# Patient Record
Sex: MALE | Race: WHITE | NOT HISPANIC OR LATINO
[De-identification: names, ages, dates, MRNs, and addresses within clinical notes are randomized per-mention and may not be internally consistent; named-entity substitution may affect disease eponyms.]

---

## 2018-08-17 ENCOUNTER — APPOINTMENT (OUTPATIENT)
Dept: VASCULAR SURGERY | Facility: CLINIC | Age: 73
End: 2018-08-17

## 2018-09-12 ENCOUNTER — OUTPATIENT (OUTPATIENT)
Dept: OUTPATIENT SERVICES | Facility: HOSPITAL | Age: 73
LOS: 1 days | Discharge: HOME | End: 2018-09-12

## 2018-09-12 ENCOUNTER — RECORD ABSTRACTING (OUTPATIENT)
Age: 73
End: 2018-09-12

## 2018-09-12 ENCOUNTER — APPOINTMENT (OUTPATIENT)
Dept: VASCULAR SURGERY | Facility: CLINIC | Age: 73
End: 2018-09-12
Payer: COMMERCIAL

## 2018-09-12 VITALS
DIASTOLIC BLOOD PRESSURE: 70 MMHG | WEIGHT: 161 LBS | SYSTOLIC BLOOD PRESSURE: 160 MMHG | HEIGHT: 60 IN | BODY MASS INDEX: 31.61 KG/M2

## 2018-09-12 DIAGNOSIS — Z86.79 PERSONAL HISTORY OF OTHER DISEASES OF THE CIRCULATORY SYSTEM: ICD-10-CM

## 2018-09-12 DIAGNOSIS — L97.919 NON-PRESSURE CHRONIC ULCER OF UNSPECIFIED PART OF RIGHT LOWER LEG WITH UNSPECIFIED SEVERITY: ICD-10-CM

## 2018-09-12 DIAGNOSIS — I73.9 PERIPHERAL VASCULAR DISEASE, UNSPECIFIED: ICD-10-CM

## 2018-09-12 DIAGNOSIS — I65.23 OCCLUSION AND STENOSIS OF BILATERAL CAROTID ARTERIES: ICD-10-CM

## 2018-09-12 DIAGNOSIS — Z87.891 PERSONAL HISTORY OF NICOTINE DEPENDENCE: ICD-10-CM

## 2018-09-12 PROCEDURE — 93880 EXTRACRANIAL BILAT STUDY: CPT

## 2018-09-12 PROCEDURE — 99213 OFFICE O/P EST LOW 20 MIN: CPT

## 2018-09-18 LAB
BUN SERPL-MCNC: 27 MG/DL
CREAT SERPL-MCNC: 1.5 MG/DL

## 2018-09-24 ENCOUNTER — FORM ENCOUNTER (OUTPATIENT)
Age: 73
End: 2018-09-24

## 2018-09-25 ENCOUNTER — OUTPATIENT (OUTPATIENT)
Dept: OUTPATIENT SERVICES | Facility: HOSPITAL | Age: 73
LOS: 1 days | Discharge: HOME | End: 2018-09-25

## 2018-09-25 DIAGNOSIS — I65.23 OCCLUSION AND STENOSIS OF BILATERAL CAROTID ARTERIES: ICD-10-CM

## 2018-10-03 ENCOUNTER — APPOINTMENT (OUTPATIENT)
Dept: VASCULAR SURGERY | Facility: CLINIC | Age: 73
End: 2018-10-03
Payer: COMMERCIAL

## 2018-10-03 DIAGNOSIS — I70.235 ATHEROSCLEROSIS OF NATIVE ARTERIES OF RIGHT LEG WITH ULCERATION OF OTHER PART OF FOOT: ICD-10-CM

## 2018-10-03 DIAGNOSIS — I65.23 OCCLUSION AND STENOSIS OF BILATERAL CAROTID ARTERIES: ICD-10-CM

## 2018-10-03 PROCEDURE — 93926 LOWER EXTREMITY STUDY: CPT

## 2018-10-03 PROCEDURE — 99213 OFFICE O/P EST LOW 20 MIN: CPT

## 2020-07-07 ENCOUNTER — APPOINTMENT (OUTPATIENT)
Dept: HEMATOLOGY ONCOLOGY | Facility: CLINIC | Age: 75
End: 2020-07-07
Payer: COMMERCIAL

## 2020-07-07 ENCOUNTER — LABORATORY RESULT (OUTPATIENT)
Age: 75
End: 2020-07-07

## 2020-07-07 VITALS
DIASTOLIC BLOOD PRESSURE: 88 MMHG | SYSTOLIC BLOOD PRESSURE: 200 MMHG | HEART RATE: 72 BPM | BODY MASS INDEX: 34.36 KG/M2 | WEIGHT: 175 LBS | HEIGHT: 60 IN | TEMPERATURE: 97.5 F | RESPIRATION RATE: 14 BRPM

## 2020-07-07 PROCEDURE — 88189 FLOWCYTOMETRY/READ 16 & >: CPT

## 2020-07-07 PROCEDURE — 99205 OFFICE O/P NEW HI 60 MIN: CPT

## 2020-07-08 LAB
ALBUMIN SERPL ELPH-MCNC: 4.4 G/DL
ALP BLD-CCNC: 76 U/L
ALT SERPL-CCNC: 21 U/L
ANION GAP SERPL CALC-SCNC: 13 MMOL/L
AST SERPL-CCNC: 24 U/L
BILIRUB SERPL-MCNC: 0.4 MG/DL
BUN SERPL-MCNC: 25 MG/DL
CALCIUM SERPL-MCNC: 9.4 MG/DL
CHLORIDE SERPL-SCNC: 100 MMOL/L
CO2 SERPL-SCNC: 25 MMOL/L
CREAT SERPL-MCNC: 1.5 MG/DL
FERRITIN SERPL-MCNC: 72 NG/ML
FOLATE SERPL-MCNC: >20 NG/ML
GLUCOSE SERPL-MCNC: 120 MG/DL
HBV CORE IGG+IGM SER QL: NONREACTIVE
HBV SURFACE AB SER QL: REACTIVE
HBV SURFACE AG SER QL: NONREACTIVE
HCT VFR BLD CALC: 47.3 %
HCV AB SER QL: NONREACTIVE
HCV S/CO RATIO: 0.1 S/CO
HGB BLD-MCNC: 15.1 G/DL
HIV1+2 AB SPEC QL IA.RAPID: NONREACTIVE
IRON SATN MFR SERPL: 17 %
IRON SERPL-MCNC: 64 UG/DL
IRON SERPL-MCNC: 65 UG/DL
LDH SERPL-CCNC: 266 U/L
MCHC RBC-ENTMCNC: 30.3 PG
MCHC RBC-ENTMCNC: 31.9 G/DL
MCV RBC AUTO: 94.8 FL
PLATELET # BLD AUTO: 137 K/UL
PMV BLD: 12.1 FL
POTASSIUM SERPL-SCNC: 4.6 MMOL/L
PROT SERPL-MCNC: 6.9 G/DL
RBC # BLD: 4.99 M/UL
RBC # FLD: 15 %
RETICS # AUTO: 2.2 %
RETICS AGGREG/RBC NFR: 108.3 K/UL
SODIUM SERPL-SCNC: 138 MMOL/L
TIBC SERPL-MCNC: 383 UG/DL
UIBC SERPL-MCNC: 318 UG/DL
VIT B12 SERPL-MCNC: 364 PG/ML
WBC # FLD AUTO: 13.52 K/UL

## 2020-07-08 NOTE — CONSULT LETTER
[Dear  ___] : Dear  [unfilled], [Consult Letter:] : I had the pleasure of evaluating your patient, [unfilled]. [( Thank you for referring [unfilled] for consultation for _____ )] : Thank you for referring [unfilled] for consultation for [unfilled] [Please see my note below.] : Please see my note below. [Consult Closing:] : Thank you very much for allowing me to participate in the care of this patient.  If you have any questions, please do not hesitate to contact me. [Sincerely,] : Sincerely, [FreeTextEntry3] : Yulissa Bermeo MD

## 2020-07-08 NOTE — REASON FOR VISIT
[Initial Consultation] : an initial consultation for [FreeTextEntry2] : Thrombocytopenia, self referred

## 2020-07-08 NOTE — PHYSICAL EXAM
[Restricted in physically strenuous activity but ambulatory and able to carry out work of a light or sedentary nature] : Status 1- Restricted in physically strenuous activity but ambulatory and able to carry out work of a light or sedentary nature, e.g., light house work, office work [Normal] : affect appropriate [de-identified] : distended

## 2020-07-08 NOTE — REVIEW OF SYSTEMS
[Easy Bruising] : a tendency for easy bruising [Negative] : Allergic/Immunologic [Easy Bleeding] : no tendency for easy bleeding [Swollen Glands] : no swollen glands

## 2020-07-08 NOTE — ASSESSMENT
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 fib discontinued 2/2 easy bruising, recent onset \par \par Leukocytosis\par --Patient reports h/o "polycythemia" requiring phlebotomies years ago, but not recently \par --Labwork today \par --US of the spleen ordered on 7/8/2020\par --Patient declines bone marrow biopsy\par --Will attempt to obtain records from Dr. Srivastava \par \par Mild iron deficiency \par --Sat 18%, ferritin  101\par --Not on supplementation\par \par Follow up in 3 weeks \par

## 2020-07-08 NOTE — HISTORY OF PRESENT ILLNESS
[de-identified] : Irma is a dyana 74 yo gentleman with extensive hematologic history, i have no records for review from his prior hematologists, all history is taken from the patient. \par He reports that 15-20 years ago he was followed at [facility] and was diagnosed with "polycythemia" requiring phlebotomies, he has not required any phlebotomies for many years now. He also reports having been diagnosed with ITP, he reports his platelets were really low and he required high dose steroids, since he has been managed with Prednisone at 5mg daily for many years. During past several years his platelets have been hovering between 70K to low normal range.\par He reports no history of bleeding, but recently he has developed easy busing. \par He continues with daily baby ASA, he reports that previously he was taking Pradaxa for A. fib, this got discontinued with easy bruisability onset. Bruising has not per patient improved with discontinuation of Pradaxa. \par He reports that he has had bone marrow biopsy years ago, and is not keen on repeating the procedure now. \par He does not know if his sleen is enlarged or not. \par He has been following with Dr. Srivastava over the past few years and now wishes to switch care. \par He reports no history of VTE or CVAs. \par he reports h/o cardiac sten in the past, subsequently he required CABG, done at [facility] approximately 3 years ago, this was complicated by what sounds like wound infection, requiring additional hospitalization for 3 weeks. He also reports h/o L carotid stent. There was also consideration for R carotid intervention, this was never done. He has h/o PAD as well. He was last seen by [facility] in 2018.  [de-identified] : 7/8/2020: Today apart from easy bruising he denies any other complaints. Denies recent weight loss, early satiety, bleeding GI or . He is aware his iron has been low in the past, he is not on oral iron supplementation. He is compliant with Prednisone and ASA. He declines BMBx at this time.

## 2020-07-09 LAB
ANA SER IF-ACNC: NEGATIVE
EPO SERPL-MCNC: 12.9 MIU/ML

## 2020-07-15 ENCOUNTER — OUTPATIENT (OUTPATIENT)
Dept: OUTPATIENT SERVICES | Facility: HOSPITAL | Age: 75
LOS: 1 days | Discharge: HOME | End: 2020-07-15
Payer: COMMERCIAL

## 2020-07-15 DIAGNOSIS — D69.6 THROMBOCYTOPENIA, UNSPECIFIED: ICD-10-CM

## 2020-07-15 LAB
AMINO ACID MPL: NORMAL
ASSAY DETAILS MPL: NORMAL
BLOCK/SPECIMEN ID: NORMAL
EXON MPL: NORMAL
GENE MPL: NORMAL
GENE XXX MUT ANL BLD/T: NORMAL
INTERPRETATION: NORMAL
Lab: NORMAL
MPL EXON 10 MUTATION: NOT DETECTED
MPL SPECIMEN SOURCE: NORMAL
MUTATION TYPE: NORMAL
MUTFREQ: NORMAL
NUCCHA: NORMAL
REFERENCE MPL: NORMAL
T(9;22)(ABL1,BCR)/CONTROL BLD/T: NORMAL

## 2020-07-15 PROCEDURE — 76705 ECHO EXAM OF ABDOMEN: CPT | Mod: 26

## 2020-07-28 ENCOUNTER — LABORATORY RESULT (OUTPATIENT)
Age: 75
End: 2020-07-28

## 2020-07-28 ENCOUNTER — OUTPATIENT (OUTPATIENT)
Dept: OUTPATIENT SERVICES | Facility: HOSPITAL | Age: 75
LOS: 1 days | Discharge: HOME | End: 2020-07-28

## 2020-07-28 ENCOUNTER — APPOINTMENT (OUTPATIENT)
Dept: HEMATOLOGY ONCOLOGY | Facility: CLINIC | Age: 75
End: 2020-07-28
Payer: COMMERCIAL

## 2020-07-28 VITALS
HEIGHT: 60 IN | RESPIRATION RATE: 14 BRPM | SYSTOLIC BLOOD PRESSURE: 237 MMHG | WEIGHT: 173 LBS | DIASTOLIC BLOOD PRESSURE: 102 MMHG | BODY MASS INDEX: 33.96 KG/M2 | HEART RATE: 63 BPM | TEMPERATURE: 97 F

## 2020-07-28 DIAGNOSIS — D69.6 THROMBOCYTOPENIA, UNSPECIFIED: ICD-10-CM

## 2020-07-28 DIAGNOSIS — D72.829 ELEVATED WHITE BLOOD CELL COUNT, UNSPECIFIED: ICD-10-CM

## 2020-07-28 PROCEDURE — 99213 OFFICE O/P EST LOW 20 MIN: CPT

## 2020-07-30 LAB
HCT VFR BLD CALC: 43.9 %
HGB BLD-MCNC: 14.3 G/DL
MCHC RBC-ENTMCNC: 30.6 PG
MCHC RBC-ENTMCNC: 32.6 G/DL
MCV RBC AUTO: 94 FL
PLATELET # BLD AUTO: 123 K/UL
PMV BLD: 12.5 FL
RBC # BLD: 4.67 M/UL
RBC # FLD: 14.5 %
WBC # FLD AUTO: 14.34 K/UL

## 2020-08-09 NOTE — CONSULT LETTER
[Dear  ___] : Dear  [unfilled], [( Thank you for referring [unfilled] for consultation for _____ )] : Thank you for referring [unfilled] for consultation for [unfilled] [Please see my note below.] : Please see my note below. [Consult Letter:] : I had the pleasure of evaluating your patient, [unfilled]. [Sincerely,] : Sincerely, [Consult Closing:] : Thank you very much for allowing me to participate in the care of this patient.  If you have any questions, please do not hesitate to contact me. [FreeTextEntry3] : Yulissa Bermeo MD

## 2020-08-09 NOTE — HISTORY OF PRESENT ILLNESS
[de-identified] : Irma is a dyana 74 yo gentleman with extensive hematologic history, i have no records for review from his prior hematologists, all history is taken from the patient. \par He reports that 15-20 years ago he was followed at [facility] and was diagnosed with "polycythemia" requiring phlebotomies, he has not required any phlebotomies for many years now. He also reports having been diagnosed with ITP, he reports his platelets were really low and he required high dose steroids, since he has been managed with Prednisone at 5mg daily for many years. During past several years his platelets have been hovering between 70K to low normal range.\par He reports no history of bleeding, but recently he has developed easy busing. \par He continues with daily baby ASA, he reports that previously he was taking Pradaxa for A. fib, this got discontinued with easy bruisability onset. Bruising has not per patient improved with discontinuation of Pradaxa. \par He reports that he has had bone marrow biopsy years ago, and is not keen on repeating the procedure now. \par He does not know if his sleen is enlarged or not. \par He has been following with Dr. Srivastava over the past few years and now wishes to switch care. \par He reports no history of VTE or CVAs. \par he reports h/o cardiac sten in the past, subsequently he required CABG, done at [facility] approximately 3 years ago, this was complicated by what sounds like wound infection, requiring additional hospitalization for 3 weeks. He also reports h/o L carotid stent. There was also consideration for R carotid intervention, this was never done. He has h/o PAD as well. He was last seen by [facility] in 2018.  [de-identified] : 7/8/2020: Today apart from easy bruising he denies any other complaints. Denies recent weight loss, early satiety, bleeding GI or . He is aware his iron has been low in the past, he is not on oral iron supplementation. He is compliant with Prednisone and ASA. He declines BMBx at this time. \par \par 7/28/2020: No complaints today,except easy bruising. He continues to take low dose prednisone and ASA. He has not done the US of the spleen. We have reviewed bloodwork today from 7/8/2020, that reveals no evidence of Jak2, MPL, CALR or BCR/ABL. 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

## 2020-08-09 NOTE — PHYSICAL EXAM
[Restricted in physically strenuous activity but ambulatory and able to carry out work of a light or sedentary nature] : Status 1- Restricted in physically strenuous activity but ambulatory and able to carry out work of a light or sedentary nature, e.g., light house work, office work [Normal] : affect appropriate [de-identified] : distended

## 2020-08-09 NOTE — ASSESSMENT
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 fib discontinued 2/2 easy bruising, recent onset \par \par Leukocytosis\par --Patient reports h/o "polycythemia" requiring phlebotomies years ago, but not recently \par --Jak2, CALR, MPL, BCR/ABL negative\par --US of the spleen ordered on 7/8/2020, not done yet\par --Patient declines bone marrow biopsy\par --Will attempt to obtain records from Dr. Srivastava \par \par Mild iron deficiency \par --Sat 18%, ferritin  101\par --Not on supplementation\par \par Follow up in 2-3 months\par

## 2020-09-23 ENCOUNTER — APPOINTMENT (OUTPATIENT)
Dept: HEMATOLOGY ONCOLOGY | Facility: CLINIC | Age: 75
End: 2020-09-23

## 2022-04-05 ENCOUNTER — LABORATORY RESULT (OUTPATIENT)
Age: 77
End: 2022-04-05

## 2022-04-05 ENCOUNTER — APPOINTMENT (OUTPATIENT)
Dept: HEMATOLOGY ONCOLOGY | Facility: CLINIC | Age: 77
End: 2022-04-05
Payer: COMMERCIAL

## 2022-04-05 VITALS
WEIGHT: 171.96 LBS | RESPIRATION RATE: 14 BRPM | TEMPERATURE: 98.5 F | DIASTOLIC BLOOD PRESSURE: 60 MMHG | BODY MASS INDEX: 33.76 KG/M2 | SYSTOLIC BLOOD PRESSURE: 174 MMHG | HEIGHT: 60 IN | HEART RATE: 58 BPM

## 2022-04-05 DIAGNOSIS — Z82.49 FAMILY HISTORY OF ISCHEMIC HEART DISEASE AND OTHER DISEASES OF THE CIRCULATORY SYSTEM: ICD-10-CM

## 2022-04-05 LAB
HCT VFR BLD CALC: 44.8 %
HGB BLD-MCNC: 14.4 G/DL
MCHC RBC-ENTMCNC: 28.3 PG
MCHC RBC-ENTMCNC: 32.1 G/DL
MCV RBC AUTO: 88.2 FL
PLATELET # BLD AUTO: 229 K/UL
PMV BLD: 10.4 FL
RBC # BLD: 5.08 M/UL
RBC # FLD: 16.3 %
WBC # FLD AUTO: 10.94 K/UL

## 2022-04-05 PROCEDURE — 99215 OFFICE O/P EST HI 40 MIN: CPT

## 2022-04-05 NOTE — HISTORY OF PRESENT ILLNESS
[de-identified] : Irma is a dyana 76 yo gentleman with extensive hematologic history, but no records for review from his prior hematologists, all history is taken from the patient.  He has history of HTN, CAD, CABG s/p stent placement (on ASA and Xarelto) complicated by peritonitis who presents to clinic to establish care.  He reports that 15-20 years ago he was followed at [facility] and was diagnosed with "polycythemia" requiring phlebotomies, he has not required any phlebotomies for many years now. He also reports having been diagnosed with ITP, he reports his platelets were really low and he required high dose steroids, since he has been managed with Prednisone at 5mg daily for many years. During past several years his platelets have been hovering between 70K to low normal range.  He reports no history of bleeding, but recently he has developed easy bruising. He continues with daily baby ASA, he reports that previously he was taking Pradaxa for A fib, which was discontinued with easy bruisability onset.  Bruising has not improved with discontinuation of Pradaxa, but is mostly noted on dorsal aspect of bilateral upper extremities.  He reports that he has had bone marrow biopsy years ago, and is not keen on repeating the procedure now. He has been following with Dr. Srivastava over the past few years and now wishes to switch care. He reports no history of VTE or CVAs.  He also reports h/o L carotid stent. There was also consideration for R carotid intervention, this was never done. He has h/o PAD as well. He was last seen by [facility] in 2018.  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  Of note, patient also is being worked up for prostatic adenocarcinoma, Gaby 7.   [de-identified] : 7/8/2020: Today apart from easy bruising he denies any other complaints. Denies recent weight loss, early satiety, bleeding GI or . He is aware his iron has been low in the past, he is not on oral iron supplementation. He is compliant with Prednisone and ASA. He declines BMBx at this time. \par \par 7/28/2020: No complaints today,except easy bruising. He continues to take low dose prednisone and ASA. He has not done the US of the spleen. We have reviewed bloodwork today from 7/8/2020, that reveals no evidence of Jak2, MPL, CALR or BCR/ABL. 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 \par \par 4/5/22\par Patient is here for a follow-up visit for newly diagnosed GE junction cancer, newly diagnosed prostate cancer, and history of leukocytosis/thrombocytosis using [place] interpretation and accompanied by spouse via telephone.  He is transitioning care from another provider previously known to the clinic.  Reviewed most recent PET/CT imaging which shows uptake in prostate apex.  He had screening EGD + colonoscopy which noted abnormality.  Reviewed EUS done 3.29.2022 with Dr. 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  Also reviewed prostate biopsy which confirm prostate adenocarcinoma.  PSA was reportedly elevated but not available for review at initial visit.  Patient denies fever, chills, nausea, vomiting, dyspnea, dysphagia, unintentional weight loss or bleeding.  He still takes prednisone 5mg daily for hx of ITP.  \par PET/CT (3.31.2022 - [facility]) IMPRESSION:1.  Mildly increased activity in the left posterior prostate apex likely reflecting biopsy-proven malignancy.  2.  No evidence of abnormal focal hypermetabolic activity in the distal esophagus with slight concentric wall thickening. 3.  No FDG avid lymphadenopathy or distant metastasis.

## 2022-04-05 NOTE — PHYSICAL EXAM
[Restricted in physically strenuous activity but ambulatory and able to carry out work of a light or sedentary nature] : Status 1- Restricted in physically strenuous activity but ambulatory and able to carry out work of a light or sedentary nature, e.g., light house work, office work [Normal] : affect appropriate [de-identified] : wears glasses [de-identified] : distended , midline abdominal scar  [de-identified] : small bruising on dorsal aspect of bilateral hands / forearms [de-identified] : ambulating with assistance of single point cane

## 2022-04-05 NOTE — ASSESSMENT
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 Metz  \par - will send to [facility], Dr. Delon Huerta, for consultation regarding if any role for SURG intervention \par \par # Prostate Cancer , [place] Score 7 (3+4) , dx in 03/2022\par - PSA is elevated but level unknown , requested records including DVD imaging for our review\par - will scan pathology report into system \par - PET/CT imaging which shows uptake in prostate apex but no evidence of abnormal focal hypermetabolic activity in the distal esophagus with slight concentric wall thickening \par - he is following with [facility], Dr. Flores \par - will discuss options for management of prostate cancer further following intervention for newly diagnosed GE junction adenocarcinoma\par \par # H. Pylori , also noted on endoscopy from 03/2022\par - not currently taking abx\par - followup with GI, Dr. 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, on Prednisone 5mg daily, platelet count fluctuates\par - Declined bone marrow biopsy on 7/7/2020\par - PT/PTT WNL on 5/16/2020\par - CBC, BMP, LFTs, PSA, PT/INR, aPTT, iron studies, ferritin level today\par \par # Leukocytosis\par - Patient reports h/o "polycythemia" requiring phlebotomies years ago, but not recently \par - Jak2, CALR, MPL, BCR/ABL negative\par - US of the spleen ordered on 7/8/2020, not done\par \par RTC in 2 weeks , sooner if needed\par \par seen/examined w/ NP Janessa; note reviewed;case discussed\par 76 yo Ashkenazi Uats[place] man with ECOG 1-2 and significant cardiac history is diagnosed same time with prostate cancer, intermideate favorable risk, and adenocarcinoma of EG junction, [id number]\par - prostate cancer; will get PSA; however, will hold the decision on management at this time given that he has EGJ adenocarcinioma\par - EGJ adenocarcinoma, [id number]; spoke to GI and [facility] (referring physician Dr Claudette Hilton). Concerned about significant cardiac problems; Spoke to thoracic surgery Dr Qiu; he will see pt; if not a surgical candidate, will administer definitive chemoradiation as per CROSS TRIAL\par - H.Pylori; spoke to GI to start triple therapy

## 2022-04-05 NOTE — REVIEW OF SYSTEMS
[Easy Bruising] : a tendency for easy bruising [Negative] : Allergic/Immunologic [Recent Change In Weight] : ~T no recent weight change [Chest Pain] : no chest pain [Shortness Of Breath] : no shortness of breath [Skin Rash] : no skin rash [Easy Bleeding] : no tendency for easy bleeding [Swollen Glands] : no swollen glands

## 2022-04-05 NOTE — REASON FOR VISIT
[Follow-Up Visit] : a follow-up visit for [Spouse] : spouse [FreeTextEntry2] : Thrombocytopenia, self referred [TWNoteComboBox1] : [place]ij[place]

## 2022-04-06 LAB
ALBUMIN SERPL ELPH-MCNC: 4.2 G/DL
ALP BLD-CCNC: 100 U/L
ALT SERPL-CCNC: 19 U/L
ANION GAP SERPL CALC-SCNC: 13 MMOL/L
APTT BLD: 35.1 SEC
AST SERPL-CCNC: 20 U/L
BILIRUB DIRECT SERPL-MCNC: <0.2 MG/DL
BILIRUB INDIRECT SERPL-MCNC: >0 MG/DL
BILIRUB SERPL-MCNC: 0.2 MG/DL
BUN SERPL-MCNC: 30 MG/DL
CALCIUM SERPL-MCNC: 9.7 MG/DL
CHLORIDE SERPL-SCNC: 106 MMOL/L
CO2 SERPL-SCNC: 21 MMOL/L
CREAT SERPL-MCNC: 1.3 MG/DL
EGFR: 57 ML/MIN/1.73M2
FERRITIN SERPL-MCNC: 111 NG/ML
GLUCOSE SERPL-MCNC: 105 MG/DL
INR PPP: 1.05 RATIO
IRON SATN MFR SERPL: 10 %
IRON SERPL-MCNC: 35 UG/DL
POTASSIUM SERPL-SCNC: 5.5 MMOL/L
PROT SERPL-MCNC: 7 G/DL
PSA FREE FLD-MCNC: 24 %
PSA FREE SERPL-MCNC: 2.77 NG/ML
PSA SERPL-MCNC: 11.6 NG/ML
PT BLD: 12.1 SEC
SODIUM SERPL-SCNC: 140 MMOL/L
TIBC SERPL-MCNC: 340 UG/DL
UIBC SERPL-MCNC: 305 UG/DL

## 2022-04-08 ENCOUNTER — APPOINTMENT (OUTPATIENT)
Dept: CARDIOTHORACIC SURGERY | Facility: CLINIC | Age: 77
End: 2022-04-08
Payer: MEDICARE

## 2022-04-08 VITALS
HEART RATE: 60 BPM | DIASTOLIC BLOOD PRESSURE: 91 MMHG | OXYGEN SATURATION: 94 % | RESPIRATION RATE: 12 BRPM | SYSTOLIC BLOOD PRESSURE: 184 MMHG | HEIGHT: 60 IN | WEIGHT: 171 LBS | BODY MASS INDEX: 33.57 KG/M2 | TEMPERATURE: 98.2 F

## 2022-04-08 PROCEDURE — 99213 OFFICE O/P EST LOW 20 MIN: CPT

## 2022-04-08 PROCEDURE — 99072 ADDL SUPL MATRL&STAF TM PHE: CPT

## 2022-04-08 RX ORDER — CHROMIUM 200 MCG
TABLET ORAL
Refills: 0 | Status: DISCONTINUED | COMMUNITY
End: 2022-04-08

## 2022-04-08 RX ORDER — AMLODIPINE BESYLATE 10 MG/1
10 TABLET ORAL
Refills: 0 | Status: DISCONTINUED | COMMUNITY
End: 2022-04-08

## 2022-04-08 RX ORDER — PREDNISONE 5 MG/1
5 TABLET ORAL
Refills: 0 | Status: ACTIVE | COMMUNITY

## 2022-04-08 RX ORDER — RANOLAZINE 500 MG/1
500 TABLET, FILM COATED, EXTENDED RELEASE ORAL
Refills: 0 | Status: DISCONTINUED | COMMUNITY
End: 2022-04-08

## 2022-04-08 RX ORDER — NITROGLYCERIN 400 UG/1
0.4 SPRAY ORAL
Refills: 0 | Status: ACTIVE | COMMUNITY

## 2022-04-08 RX ORDER — AZILSARTAN KAMEDOXOMIL AND CHLORTHALIDONE 40; 25 MG/1; MG/1
40-25 TABLET ORAL
Refills: 0 | Status: ACTIVE | COMMUNITY

## 2022-04-08 RX ORDER — LEVOTHYROXINE SODIUM 0.12 MG/1
125 TABLET ORAL
Refills: 0 | Status: DISCONTINUED | COMMUNITY
End: 2022-04-08

## 2022-04-08 RX ORDER — ROSUVASTATIN CALCIUM 40 MG/1
40 TABLET, FILM COATED ORAL
Refills: 0 | Status: DISCONTINUED | COMMUNITY
End: 2022-04-08

## 2022-04-08 RX ORDER — CARVEDILOL 3.12 MG/1
TABLET, FILM COATED ORAL
Refills: 0 | Status: DISCONTINUED | COMMUNITY
End: 2022-04-08

## 2022-04-08 RX ORDER — FEBUXOSTAT 40 MG/1
40 TABLET ORAL
Refills: 0 | Status: DISCONTINUED | COMMUNITY
End: 2022-04-08

## 2022-04-08 RX ORDER — ASPIRIN 81 MG
81 TABLET, DELAYED RELEASE (ENTERIC COATED) ORAL
Refills: 0 | Status: DISCONTINUED | COMMUNITY
End: 2022-04-08

## 2022-04-08 RX ORDER — CHOLECALCIFEROL (VITAMIN D3) 1250 MCG
1.25 MG TABLET ORAL
Refills: 0 | Status: DISCONTINUED | COMMUNITY
End: 2022-04-08

## 2022-04-08 RX ORDER — AMLODIPINE BESYLATE 5 MG/1
5 TABLET ORAL
Refills: 0 | Status: ACTIVE | COMMUNITY

## 2022-04-08 RX ORDER — PREDNISONE 5 MG/1
5 TABLET ORAL
Refills: 0 | Status: DISCONTINUED | COMMUNITY
End: 2022-04-08

## 2022-04-08 RX ORDER — ALLOPURINOL 100 MG/1
100 TABLET ORAL
Refills: 0 | Status: ACTIVE | COMMUNITY

## 2022-04-08 RX ORDER — ROSUVASTATIN CALCIUM 10 MG/1
10 TABLET, FILM COATED ORAL
Refills: 0 | Status: ACTIVE | COMMUNITY

## 2022-04-08 RX ORDER — AZILSARTAN KAMEDOXOMIL AND CHLORTHALIDONE 40; 25 MG/1; MG/1
40-25 TABLET ORAL
Refills: 0 | Status: DISCONTINUED | COMMUNITY
End: 2022-04-08

## 2022-04-08 RX ORDER — LEVOTHYROXINE SODIUM 0.12 MG/1
125 TABLET ORAL
Refills: 0 | Status: ACTIVE | COMMUNITY

## 2022-04-11 NOTE — HISTORY OF PRESENT ILLNESS
[FreeTextEntry1] : Mr. Villalta is a 76 y/o male that arrives today for evaluation of JE junction carcinoma. PMH significant for HTN, MI, CAD s/p CABG X 2 by Dr. Kath Sánchez, complicated by infection requiring sternal to pelvis incision, DLD,  h/o carotid stenosis,  left ICA stenting by Dr. Lucia, peripheral vascular stenting in b/l LE, prostate cancer. He arrives today for evaluation of his esophageal cancer with Dr. Delon Huerta\par \par PMD: Misty Sotelo \par Oncologist:PIA Shankar \par \par  Russian Jennifer [phone]

## 2022-04-11 NOTE — DATA REVIEWED
[FreeTextEntry1] : TECHNIQUE:\par  \par 60 minutes following injection of the radiopharmaceutical, PET/CT imaging was performed from the skull base to mid thigh.  Fasting serum glucose was 87 mg/dL prior to injection.  Oral or IV contrast was not given per protocol.  All SUV values reported represent maximum SUV (SUV max) unless otherwise specified.  \par  \par This study was interpreted using a lean body mass corrected SUV technique.  Please note this may result in lower SUV values compared to a body-weight corrected technique. If there is a prior PET/CT for comparison, please see this current report for lean body mass corrected SUV values for the current study and the prior study.\par  \par COMPARISON:\par  \par None available\par  \par FINDINGS:\par  \par Head and neck:\par  \par There is no suspicious FDG uptake in the head and neck.\par  \par No significant head and neck lymphadenopathy on CT.  There is mucosal thickening of the maxillary sinuses.\par  \par Chest:\par  \par There is no suspicious FDG uptake in the chest.\par  \par There is slight concentric wall thickening of the distal end of the esophagus without hypermetabolic activity.  Please note: Soft tissue lesions smaller than 1 x 1 x 1 cm are below the resolution of PET scan.\par  \par On CT, there is no supraclavicular, hilar, mediastinal or axillary lymphadenopathy.  Multivessel coronary calcification with stents and atherosclerotic aortic calcification are noted.  Status post median sternotomy and CABG.  Mildly enlarged heart.  No pleural or pericardial effusion.  No significant lung nodule.\par  \par Abdomen and pelvis:\par  \par There is no suspicious FDG uptake in the abdomen/pelvis.\par  \par The prostate is enlarged measuring 5.2 x 4.9 cm with mildly increased activity in the left posterior prostate apex image 207, SUV 1.9, likely reflecting biopsy-proven malignancy.\par  \par The solid organs of the abdomen and pelvis are unremarkable on unenhanced CT. Status post cholecystectomy.  No significant lymphadenopathy.  No acute bowel-related abnormality.  Fatty replacement of the pancreas, heavy atherosclerotic vascular calcification, severe sigmoid diverticulosis and bilateral femoral arterial stents are visualized.\par  \par Musculoskeletal and extremities:\par  \par There are no suspicious FDG-avid osseous lesions.\par  \par No aggressive osseous lesions are seen on CT. Scoliosis associated with multilevel degenerative changes in the spine.\par  \par Please note KEY IMAGES for this PET/CT study are visible in i[employer] by scrolling to the far right of the collection of image preview thumbnails.\par  \par Diagnostic confidence level used in this report:\par  \par Consistent with/compatible with or no modifier - greater than 98%\par Most likely - greater than 90%\par Likely/probably - greater than 75%\par Possibly 50%\par Less likely - less than 25%\par Unlikely - less than 5%\par  \par Electronic Signature: I personally reviewed the images and agree with this report. Final Report: Dictated by  and Signed by Attending Edelmira Colvin MD 3/31/2022 2:46 PM\par  \par IMPRESSION:\par  \par 1.  Mildly increased activity in the left posterior prostate apex likely reflecting biopsy-proven malignancy.  \par  \par 2.  No evidence of abnormal focal hypermetabolic activity in the distal esophagus with slight concentric wall thickening.\par  \par 3.  No FDG avid lymphadenopathy or distant metastasis.\par  \par Pathology for Endoscopy \par Slewert Classification Type 2 EG Junction Adenocarcinoma s/p Biopsy and Staging \par

## 2022-04-13 ENCOUNTER — APPOINTMENT (OUTPATIENT)
Dept: HEMATOLOGY ONCOLOGY | Facility: CLINIC | Age: 77
End: 2022-04-13
Payer: COMMERCIAL

## 2022-04-13 VITALS
HEART RATE: 62 BPM | HEIGHT: 60 IN | BODY MASS INDEX: 32.2 KG/M2 | DIASTOLIC BLOOD PRESSURE: 61 MMHG | RESPIRATION RATE: 20 BRPM | WEIGHT: 164 LBS | TEMPERATURE: 98.2 F | SYSTOLIC BLOOD PRESSURE: 147 MMHG

## 2022-04-13 PROCEDURE — 99215 OFFICE O/P EST HI 40 MIN: CPT

## 2022-04-13 RX ORDER — ONDANSETRON 8 MG/1
8 TABLET ORAL EVERY 8 HOURS
Qty: 21 | Refills: 2 | Status: ACTIVE | COMMUNITY
Start: 2022-04-13 | End: 1900-01-01

## 2022-04-13 RX ORDER — PROCHLORPERAZINE MALEATE 10 MG/1
10 TABLET ORAL
Qty: 180 | Refills: 3 | Status: ACTIVE | COMMUNITY
Start: 2022-04-13 | End: 1900-01-01

## 2022-04-13 NOTE — ASSESSMENT
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 Metz  \par - s/p evalauation by  [facility], Dr. Delon Huerta, for consultation regarding if any role for SURG intervention : consensus is to proceed w/ concurrent chemoradiation as per CROSS trial\par - spoke to Dr YENNI HILTON, [facility]: pt is seeing her 4/13/22 and would be advised about the date of intiation of RT\par - chemo is low dose carboplatin/paclitaxel weekly\par - explained side effects with pt and his wife\par - f/u with the 2nd weekly chemo dose\par \par # Prostate Cancer , Gaby Score 7 (3+4) , dx in 03/2022\par - PSA is elevated but level unknown , requested records including DVD imaging for our review\par - will scan pathology report into system \par - PET/CT imaging which shows uptake in prostate apex but no evidence of abnormal focal hypermetabolic activity in the distal esophagus with slight concentric wall thickening \par - he is following with [facility], Dr. Flores \par - will discuss options for management of prostate cancer further following intervention for newly diagnosed GE junction adenocarcinoma\par \par # H. Pylori , also noted on endoscopy from 03/2022\par - not currently taking abx\par - followup with GI, Dr. 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, on Prednisone 5mg daily, platelet count fluctuates\par - Declined bone marrow biopsy on 7/7/2020\par - PT/PTT WNL on 5/16/2020\par - CBC, BMP, LFTs, PSA, PT/INR, aPTT, iron studies, ferritin level today\par \par # Leukocytosis\par - Patient reports h/o "polycythemia" requiring phlebotomies years ago, but not recently \par - Jak2, CALR, MPL, BCR/ABL negative\par - US of the spleen ordered on 7/8/2020, not done\par \par 78 yo Ashkenazi Jew man with ECOG 1-2 and significant cardiac history is diagnosed same time with prostate cancer, intermideate favorable risk, and adenocarcinoma of EG junction, [id number]\par - prostate cancer; will get PSA; however, will hold the decision on management at this time given that he has EGJ adenocarcinioma\par - EGJ adenocarcinoma, [id number]; spoke to GI and [facility] (referring physician Dr Yenni Hilton). Concerned about significant cardiac problems; Spoke to thoracic surgery Dr Qiu: not a surgical candidate, will administer definitive chemoradiation as per CROSS TRIAL\par - H.Pylori; spoke to GI to start triple therapy\par - ITP : plats count 220k; on prednisone 5mg daily; not sure why; would reevalaute this next visit

## 2022-04-13 NOTE — REASON FOR VISIT
[Follow-Up Visit] : a follow-up visit for [Spouse] : spouse [FreeTextEntry2] : Thrombocytopenia, self referred [TWNoteComboBox1] : [place]

## 2022-04-13 NOTE — PHYSICAL EXAM
[Restricted in physically strenuous activity but ambulatory and able to carry out work of a light or sedentary nature] : Status 1- Restricted in physically strenuous activity but ambulatory and able to carry out work of a light or sedentary nature, e.g., light house work, office work [Normal] : affect appropriate [de-identified] : wears glasses [de-identified] : distended , midline abdominal scar  [de-identified] : ambulating with assistance of single point cane [de-identified] : small bruising on dorsal aspect of bilateral hands / forearms

## 2022-04-13 NOTE — HISTORY OF PRESENT ILLNESS
[de-identified] : 7/8/2020: Today apart from easy bruising he denies any other complaints. Denies recent weight loss, early satiety, bleeding GI or . He is aware his iron has been low in the past, he is not on oral iron supplementation. He is compliant with Prednisone and ASA. He declines BMBx at this time. \par \par 7/28/2020: No complaints today,except easy bruising. He continues to take low dose prednisone and ASA. He has not done the US of the spleen. We have reviewed bloodwork today from 7/8/2020, that reveals no evidence of Jak2, MPL, CALR or BCR/ABL. 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 \par \par 4/5/22\par Patient is here for a follow-up visit for newly diagnosed GE junction cancer, newly diagnosed prostate cancer, and history of leukocytosis/thrombocytosis using Gabonese interpretation and accompanied by spouse via telephone.  He is transitioning care from another provider previously known to the clinic.  Reviewed most recent PET/CT imaging which shows uptake in prostate apex.  He had screening EGD + colonoscopy which noted abnormality.  Reviewed EUS done 3.29.2022 with Dr. 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  Also reviewed prostate biopsy which confirm prostate adenocarcinoma.  PSA was reportedly elevated but not available for review at initial visit.  Patient denies fever, chills, nausea, vomiting, dyspnea, dysphagia, unintentional weight loss or bleeding.  He still takes prednisone 5mg daily for hx of ITP.  \par PET/CT (3.31.2022 - [facility]) IMPRESSION:1.  Mildly increased activity in the left posterior prostate apex likely reflecting biopsy-proven malignancy.  2.  No evidence of abnormal focal hypermetabolic activity in the distal esophagus with slight concentric wall thickening. 3.  No FDG avid lymphadenopathy or distant metastasis.  [de-identified] : Irma is a dyana 76 yo gentleman with extensive hematologic history, but no records for review from his prior hematologists, all history is taken from the patient.  He has history of HTN, CAD, CABG s/p stent placement (on ASA and Xarelto) complicated by peritonitis who presents to clinic to establish care.  He reports that 15-20 years ago he was followed at [facility] and was diagnosed with "polycythemia" requiring phlebotomies, he has not required any phlebotomies for many years now. He also reports having been diagnosed with ITP, he reports his platelets were really low and he required high dose steroids, since he has been managed with Prednisone at 5mg daily for many years. During past several years his platelets have been hovering between 70K to low normal range.  He reports no history of bleeding, but recently he has developed easy bruising. He continues with daily baby ASA, he reports that previously he was taking Pradaxa for A fib, which was discontinued with easy bruisability onset.  Bruising has not improved with discontinuation of Pradaxa, but is mostly noted on dorsal aspect of bilateral upper extremities.  He reports that he has had bone marrow biopsy years ago, and is not keen on repeating the procedure now. He has been following with Dr. Srivastava over the past few years and now wishes to switch care. He reports no history of VTE or CVAs.  He also reports h/o L carotid stent. There was also consideration for R carotid intervention, this was never done. He has h/o PAD as well. He was last seen by [facility] in 2018.  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  Of note, patient also is being worked up for prostatic adenocarcinoma, Gaby 7.

## 2022-04-13 NOTE — CONSULT LETTER
[Dear  ___] : Dear  [unfilled], [Consult Letter:] : I had the pleasure of evaluating your patient, [unfilled]. [( Thank you for referring [unfilled] for consultation for _____ )] : Thank you for referring [unfilled] for consultation for [unfilled] [Please see my note below.] : Please see my note below. [Consult Closing:] : Thank you very much for allowing me to participate in the care of this patient.  If you have any questions, please do not hesitate to contact me. [Sincerely,] : Sincerely, [FreeTextEntry3] : Heriberto Shankar DO\par Attending Physician,\par Hematology/ Medical Oncology\par [phone] office\par \par

## 2022-04-25 ENCOUNTER — APPOINTMENT (OUTPATIENT)
Dept: INFUSION THERAPY | Facility: CLINIC | Age: 77
End: 2022-04-25

## 2022-04-25 RX ORDER — FAMOTIDINE 10 MG/ML
20 INJECTION INTRAVENOUS ONCE
Refills: 0 | Status: COMPLETED | OUTPATIENT
Start: 2022-04-25 | End: 2022-04-25

## 2022-04-25 RX ORDER — IRON SUCROSE 20 MG/ML
200 INJECTION, SOLUTION INTRAVENOUS ONCE
Refills: 0 | Status: COMPLETED | OUTPATIENT
Start: 2022-04-25 | End: 2022-04-25

## 2022-04-25 RX ORDER — PACLITAXEL 6 MG/ML
85 INJECTION, SOLUTION, CONCENTRATE INTRAVENOUS ONCE
Refills: 0 | Status: COMPLETED | OUTPATIENT
Start: 2022-04-25 | End: 2022-04-25

## 2022-04-25 RX ORDER — CARBOPLATIN 50 MG
150 VIAL (EA) INTRAVENOUS ONCE
Refills: 0 | Status: COMPLETED | OUTPATIENT
Start: 2022-04-25 | End: 2022-04-25

## 2022-04-25 RX ORDER — DIPHENHYDRAMINE HCL 50 MG
50 CAPSULE ORAL ONCE
Refills: 0 | Status: COMPLETED | OUTPATIENT
Start: 2022-04-25 | End: 2022-04-25

## 2022-04-25 RX ORDER — DEXAMETHASONE 0.5 MG/5ML
20 ELIXIR ORAL ONCE
Refills: 0 | Status: COMPLETED | OUTPATIENT
Start: 2022-04-25 | End: 2022-04-25

## 2022-04-25 RX ADMIN — FAMOTIDINE 104 MILLIGRAM(S): 10 INJECTION INTRAVENOUS at 12:28

## 2022-04-25 RX ADMIN — IRON SUCROSE 220 MILLIGRAM(S): 20 INJECTION, SOLUTION INTRAVENOUS at 12:29

## 2022-04-25 RX ADMIN — Medication 126 MILLIGRAM(S): at 12:28

## 2022-04-25 RX ADMIN — PACLITAXEL 264.17 MILLIGRAM(S): 6 INJECTION, SOLUTION, CONCENTRATE INTRAVENOUS at 13:47

## 2022-04-25 RX ADMIN — Medication 265 MILLIGRAM(S): at 13:47

## 2022-04-25 RX ADMIN — Medication 102 MILLIGRAM(S): at 12:28

## 2022-04-26 LAB
ALBUMIN SERPL ELPH-MCNC: 3.9 G/DL
ALP BLD-CCNC: 89 U/L
ALT SERPL-CCNC: 13 U/L
ANION GAP SERPL CALC-SCNC: 14 MMOL/L
AST SERPL-CCNC: 18 U/L
BILIRUB DIRECT SERPL-MCNC: <0.2 MG/DL
BILIRUB INDIRECT SERPL-MCNC: >0.2 MG/DL
BILIRUB SERPL-MCNC: 0.4 MG/DL
BUN SERPL-MCNC: 25 MG/DL
CALCIUM SERPL-MCNC: 8.7 MG/DL
CHLORIDE SERPL-SCNC: 103 MMOL/L
CO2 SERPL-SCNC: 21 MMOL/L
CREAT SERPL-MCNC: 1.5 MG/DL
EGFR: 48 ML/MIN/1.73M2
GLUCOSE SERPL-MCNC: 143 MG/DL
MAGNESIUM SERPL-MCNC: 2 MG/DL
POTASSIUM SERPL-SCNC: 4.1 MMOL/L
PROT SERPL-MCNC: 6.6 G/DL
SODIUM SERPL-SCNC: 138 MMOL/L

## 2022-04-29 ENCOUNTER — LABORATORY RESULT (OUTPATIENT)
Age: 77
End: 2022-04-29

## 2022-04-29 ENCOUNTER — APPOINTMENT (OUTPATIENT)
Dept: HEMATOLOGY ONCOLOGY | Facility: CLINIC | Age: 77
End: 2022-04-29
Payer: COMMERCIAL

## 2022-04-29 VITALS
TEMPERATURE: 98.8 F | WEIGHT: 166 LBS | BODY MASS INDEX: 32.42 KG/M2 | DIASTOLIC BLOOD PRESSURE: 83 MMHG | SYSTOLIC BLOOD PRESSURE: 183 MMHG | HEART RATE: 80 BPM

## 2022-04-29 DIAGNOSIS — D72.829 ELEVATED WHITE BLOOD CELL COUNT, UNSPECIFIED: ICD-10-CM

## 2022-04-29 DIAGNOSIS — D69.6 THROMBOCYTOPENIA, UNSPECIFIED: ICD-10-CM

## 2022-04-29 DIAGNOSIS — Z00.00 ENCOUNTER FOR GENERAL ADULT MEDICAL EXAMINATION W/OUT ABNORMAL FINDINGS: ICD-10-CM

## 2022-04-29 LAB
ALBUMIN SERPL ELPH-MCNC: 3.8 G/DL
ALP BLD-CCNC: 92 U/L
ALT SERPL-CCNC: 17 U/L
ANION GAP SERPL CALC-SCNC: 14 MMOL/L
AST SERPL-CCNC: 17 U/L
BILIRUB DIRECT SERPL-MCNC: 0.2 MG/DL
BILIRUB INDIRECT SERPL-MCNC: 0.3 MG/DL
BILIRUB SERPL-MCNC: 0.5 MG/DL
BUN SERPL-MCNC: 31 MG/DL
CALCIUM SERPL-MCNC: 9.1 MG/DL
CHLORIDE SERPL-SCNC: 102 MMOL/L
CO2 SERPL-SCNC: 24 MMOL/L
CREAT SERPL-MCNC: 1.5 MG/DL
EGFR: 48 ML/MIN/1.73M2
GLUCOSE SERPL-MCNC: 108 MG/DL
HCT VFR BLD CALC: 44.5 %
HGB BLD-MCNC: 14.2 G/DL
MAGNESIUM SERPL-MCNC: 1.9 MG/DL
MCHC RBC-ENTMCNC: 28.1 PG
MCHC RBC-ENTMCNC: 31.9 G/DL
MCV RBC AUTO: 88.1 FL
PLATELET # BLD AUTO: 208 K/UL
PMV BLD: 10.8 FL
POTASSIUM SERPL-SCNC: 5.1 MMOL/L
PROT SERPL-MCNC: 6.7 G/DL
RBC # BLD: 5.05 M/UL
RBC # FLD: 15.8 %
SODIUM SERPL-SCNC: 140 MMOL/L
WBC # FLD AUTO: 12.22 K/UL

## 2022-04-29 PROCEDURE — 99214 OFFICE O/P EST MOD 30 MIN: CPT

## 2022-04-29 NOTE — CONSULT LETTER
[Dear  ___] : Dear  [unfilled], [Consult Letter:] : I had the pleasure of evaluating your patient, [unfilled]. [( Thank you for referring [unfilled] for consultation for _____ )] : Thank you for referring [unfilled] for consultation for [unfilled] [Please see my note below.] : Please see my note below. [Consult Closing:] : Thank you very much for allowing me to participate in the care of this patient.  If you have any questions, please do not hesitate to contact me. [Sincerely,] : Sincerely, [FreeTextEntry3] : Heriberto Shankar DO\par Attending Physician,\par Hematology/ Medical Oncology\par [phone] office\par \par

## 2022-04-29 NOTE — HISTORY OF PRESENT ILLNESS
[Therapy: ___] : Therapy: [unfilled] [Cycle: ___] : Cycle: [unfilled] [FreeTextEntry1] : \par Started Carboplatin + Taxol on 4.25.2022 [de-identified] : 7/8/2020: Today apart from easy bruising he denies any other complaints. Denies recent weight loss, early satiety, bleeding GI or . He is aware his iron has been low in the past, he is not on oral iron supplementation. He is compliant with Prednisone and ASA. He declines BMBx at this time. \par \par 7/28/2020: No complaints today,except easy bruising. He continues to take low dose prednisone and ASA. He has not done the US of the spleen. We have reviewed bloodwork today from 7/8/2020, that reveals no evidence of Jak2, MPL, CALR or BCR/ABL. 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 \par \par 4/5/22\par Patient is here for a follow-up visit for newly diagnosed GE junction cancer, newly diagnosed prostate cancer, and history of leukocytosis/thrombocytosis using Burkinan interpretation and accompanied by spouse via telephone.  He is transitioning care from another provider previously known to the clinic.  Reviewed most recent PET/CT imaging which shows uptake in prostate apex.  He had screening EGD + colonoscopy which noted abnormality.  Reviewed EUS done 3.29.2022 with Dr. 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  Also reviewed prostate biopsy which confirm prostate adenocarcinoma.  PSA was reportedly elevated but not available for review at initial visit.  Patient denies fever, chills, nausea, vomiting, dyspnea, dysphagia, unintentional weight loss or bleeding.  He still takes prednisone 5mg daily for hx of ITP.  \par PET/CT (3.31.2022 - [facility]) IMPRESSION:1.  Mildly increased activity in the left posterior prostate apex likely reflecting biopsy-proven malignancy.  2.  No evidence of abnormal focal hypermetabolic activity in the distal esophagus with slight concentric wall thickening. 3.  No FDG avid lymphadenopathy or distant metastasis. \par \par 4/29/22\par Patient is here for a follow-up visit for newly diagnosed GE junction cancer, newly diagnosed prostate cancer, and history of leukocytosis/thrombocytosis using Burkinan interpretation and accompanied by spouse via telephone.  Patient started chemotherapy with Carboplatin + Taxol as part of chemoRT on 4.25.2022.  Patient denies fever, chills, nausea, vomiting, dyspnea, dysphagia, changes in urination or bleeding.  He reports some fatigue.  He has not yet seen GI for management of H. pylori.  He continues to take prednisone 5mg daily for hx of ITP.  He takes Xarelto for venous issue of lower extremities but is unsure if ever had thrombosis.   [de-identified] : Irma is a dyana 74 yo gentleman with extensive hematologic history, but no records for review from his prior hematologists, all history is taken from the patient.  He has history of HTN, CAD, CABG s/p stent placement (on ASA and Xarelto) complicated by peritonitis who presents to clinic to establish care.  He reports that 15-20 years ago he was followed at [facility] and was diagnosed with "polycythemia" requiring phlebotomies, he has not required any phlebotomies for many years now. He also reports having been diagnosed with ITP, he reports his platelets were really low and he required high dose steroids, since he has been managed with Prednisone at 5mg daily for many years. During past several years his platelets have been hovering between 70K to low normal range.  He reports no history of bleeding, but recently he has developed easy bruising. He continues with daily baby ASA, he reports that previously he was taking Pradaxa for A fib, which was discontinued with easy bruisability onset.  Bruising has not improved with discontinuation of Pradaxa, but is mostly noted on dorsal aspect of bilateral upper extremities.  He reports that he has had bone marrow biopsy years ago, and is not keen on repeating the procedure now. He has been following with Dr. Srivastava over the past few years and now wishes to switch care. He reports no history of VTE or CVAs.  He also reports h/o L carotid stent. There was also consideration for R carotid intervention, this was never done. He has h/o PAD as well. He was last seen by [facility] in 2018.  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  Of note, patient also is being worked up for prostatic adenocarcinoma, Gaby 7.

## 2022-04-29 NOTE — REVIEW OF SYSTEMS
[Easy Bruising] : a tendency for easy bruising [Negative] : Allergic/Immunologic [Fatigue] : fatigue [Recent Change In Weight] : ~T no recent weight change [Chest Pain] : no chest pain [Shortness Of Breath] : no shortness of breath [Skin Rash] : no skin rash [Easy Bleeding] : no tendency for easy bleeding [Swollen Glands] : no swollen glands

## 2022-04-29 NOTE — ASSESSMENT
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 Metz  \par - s/p evaluation by  [facility], Dr. Delon Huerta, for consultation regarding if any role for SURG intervention : consensus is to proceed w/ concurrent chemoradiation as per CROSS trial\par - spoke to Dr YENNI HILTON, [facility]: continuing concurrent chemoRT\par - c/w low dose carboplatin/paclitaxel weekly with RT (likely 5 week duration) , initiated on 4.25.2022\par \par # Prostate Cancer , [place] Score 7 (3+4) , dx in 03/2022\par - PSA is 11.6ng/mL from 04/2022 , requested records including DVD imaging for our review\par - will scan pathology report into system \par - PET/CT imaging which shows uptake in prostate apex but no evidence of abnormal focal hypermetabolic activity in the distal esophagus with slight concentric wall thickening \par - will discuss options for management of prostate cancer further following intervention for newly diagnosed GE junction adenocarcinoma\par \par # H. Pylori , also noted on endoscopy from 03/2022\par - not currently taking abx\par - followup with GI, Dr. Garibay, for management of infection ; reiterated importance\par \par # Thrombocytopenia , possibly reactive\par - Patient reports h/o of long standing ITP, on Prednisone 5mg daily, platelet count fluctuates\par - Declined bone marrow biopsy on 7/7/2020\par - PT/PTT WNL on 5/16/2020\par - ironsat 10%, ferritin 111 : continue with IV venofer x 5 doses total\par \par # Leukocytosis, stable\par - Patient reports h/o "polycythemia" requiring phlebotomies years ago, but not recently \par - Jak2, CALR, MPL, BCR/ABL negative\par - US of the spleen ordered on 7/8/2020, never done\par - will continue to monitor\par \par RTC with [facility] in 2 weeks with CBC, BMP, LFTs, Mg.  \par RTC in 4 weeks with Dr. Shankar with CBC, BMP, LFTs, Mg.\par \par 78 yo Ashkenazi [employer] man with ECOG 1-2 and significant cardiac history is diagnosed same time with prostate cancer, intermediate favorable risk, and adenocarcinoma of EG junction, [id number]\par - prostate cancer; PSA 11.6ng/mL ; however, will hold the decision on management at this time given that he has EGJ adenocarcinoma\par - EGJ adenocarcinoma, [id number]; spoke to GI and [facility] (referring physician Dr Yenni Hilton). Concerned about significant cardiac problems; Spoke to thoracic surgery Dr Qiu: not a surgical candidate, will administer definitive chemoradiation as per CROSS TRIAL\par - H.Pylori; reiterated importance for GI followup to start triple therapy (sent to Dr. Garibay) \par - ITP : plats count 208k; on prednisone 5mg daily; not sure why; would re-evalaute this following completion of therapy\par seen/examined w/ NP Janessa;note reviewed;case discussed\par

## 2022-04-29 NOTE — PHYSICAL EXAM
[Restricted in physically strenuous activity but ambulatory and able to carry out work of a light or sedentary nature] : Status 1- Restricted in physically strenuous activity but ambulatory and able to carry out work of a light or sedentary nature, e.g., light house work, office work [Normal] : affect appropriate [de-identified] : wears glasses [de-identified] : distended , midline abdominal scar  [de-identified] : ambulating with assistance of single point cane [de-identified] : small bruising on dorsal aspect of bilateral hands / forearms

## 2022-04-29 NOTE — REASON FOR VISIT
[Follow-Up Visit] : a follow-up visit for [Spouse] : spouse [FreeTextEntry2] : Thrombocytopenia, self referred [TWNoteComboBox1] : Norwegian

## 2022-05-02 ENCOUNTER — OUTPATIENT (OUTPATIENT)
Dept: OUTPATIENT SERVICES | Facility: HOSPITAL | Age: 77
LOS: 1 days | Discharge: HOME | End: 2022-05-02

## 2022-05-02 ENCOUNTER — LABORATORY RESULT (OUTPATIENT)
Age: 77
End: 2022-05-02

## 2022-05-02 ENCOUNTER — APPOINTMENT (OUTPATIENT)
Dept: INFUSION THERAPY | Facility: CLINIC | Age: 77
End: 2022-05-02

## 2022-05-02 DIAGNOSIS — D72.829 ELEVATED WHITE BLOOD CELL COUNT, UNSPECIFIED: ICD-10-CM

## 2022-05-02 DIAGNOSIS — C61 MALIGNANT NEOPLASM OF PROSTATE: ICD-10-CM

## 2022-05-02 DIAGNOSIS — C16.0 MALIGNANT NEOPLASM OF CARDIA: ICD-10-CM

## 2022-05-02 DIAGNOSIS — D69.6 THROMBOCYTOPENIA, UNSPECIFIED: ICD-10-CM

## 2022-05-02 RX ORDER — DEXAMETHASONE 0.5 MG/5ML
20 ELIXIR ORAL ONCE
Refills: 0 | Status: COMPLETED | OUTPATIENT
Start: 2022-05-02 | End: 2022-05-02

## 2022-05-02 RX ORDER — IRON SUCROSE 20 MG/ML
200 INJECTION, SOLUTION INTRAVENOUS ONCE
Refills: 0 | Status: COMPLETED | OUTPATIENT
Start: 2022-05-02 | End: 2022-05-02

## 2022-05-02 RX ORDER — CARBOPLATIN 50 MG
150 VIAL (EA) INTRAVENOUS ONCE
Refills: 0 | Status: COMPLETED | OUTPATIENT
Start: 2022-05-02 | End: 2022-05-02

## 2022-05-02 RX ORDER — PACLITAXEL 6 MG/ML
85 INJECTION, SOLUTION, CONCENTRATE INTRAVENOUS ONCE
Refills: 0 | Status: COMPLETED | OUTPATIENT
Start: 2022-05-02 | End: 2022-05-02

## 2022-05-02 RX ORDER — PACLITAXEL 6 MG/ML
85 INJECTION, SOLUTION, CONCENTRATE INTRAVENOUS ONCE
Refills: 0 | Status: DISCONTINUED | OUTPATIENT
Start: 2022-05-02 | End: 2022-05-02

## 2022-05-02 RX ORDER — FAMOTIDINE 10 MG/ML
20 INJECTION INTRAVENOUS ONCE
Refills: 0 | Status: COMPLETED | OUTPATIENT
Start: 2022-05-02 | End: 2022-05-02

## 2022-05-02 RX ORDER — DIPHENHYDRAMINE HCL 50 MG
50 CAPSULE ORAL ONCE
Refills: 0 | Status: COMPLETED | OUTPATIENT
Start: 2022-05-02 | End: 2022-05-02

## 2022-05-02 RX ADMIN — FAMOTIDINE 20 MILLIGRAM(S): 10 INJECTION INTRAVENOUS at 13:15

## 2022-05-02 RX ADMIN — PACLITAXEL 85 MILLIGRAM(S): 6 INJECTION, SOLUTION, CONCENTRATE INTRAVENOUS at 15:10

## 2022-05-02 RX ADMIN — Medication 265 MILLIGRAM(S): at 15:10

## 2022-05-02 RX ADMIN — Medication 126 MILLIGRAM(S): at 12:45

## 2022-05-02 RX ADMIN — PACLITAXEL 264.17 MILLIGRAM(S): 6 INJECTION, SOLUTION, CONCENTRATE INTRAVENOUS at 13:40

## 2022-05-02 RX ADMIN — Medication 50 MILLIGRAM(S): at 12:45

## 2022-05-02 RX ADMIN — Medication 150 MILLIGRAM(S): at 16:10

## 2022-05-02 RX ADMIN — Medication 102 MILLIGRAM(S): at 12:30

## 2022-05-02 RX ADMIN — IRON SUCROSE 200 MILLIGRAM(S): 20 INJECTION, SOLUTION INTRAVENOUS at 16:55

## 2022-05-02 RX ADMIN — Medication 20 MILLIGRAM(S): at 13:00

## 2022-05-02 RX ADMIN — IRON SUCROSE 220 MILLIGRAM(S): 20 INJECTION, SOLUTION INTRAVENOUS at 16:25

## 2022-05-02 RX ADMIN — FAMOTIDINE 104 MILLIGRAM(S): 10 INJECTION INTRAVENOUS at 13:00

## 2022-05-03 LAB
ALBUMIN SERPL ELPH-MCNC: 4.1 G/DL
ALP BLD-CCNC: 98 U/L
ALT SERPL-CCNC: 21 U/L
ANION GAP SERPL CALC-SCNC: 15 MMOL/L
AST SERPL-CCNC: 20 U/L
BILIRUB DIRECT SERPL-MCNC: <0.2 MG/DL
BILIRUB INDIRECT SERPL-MCNC: >0.2 MG/DL
BILIRUB SERPL-MCNC: 0.4 MG/DL
BUN SERPL-MCNC: 33 MG/DL
CALCIUM SERPL-MCNC: 9.3 MG/DL
CHLORIDE SERPL-SCNC: 102 MMOL/L
CO2 SERPL-SCNC: 20 MMOL/L
CREAT SERPL-MCNC: 1.5 MG/DL
EGFR: 48 ML/MIN/1.73M2
GLUCOSE SERPL-MCNC: 120 MG/DL
HCT VFR BLD CALC: 41.2 %
HGB BLD-MCNC: 13.5 G/DL
MAGNESIUM SERPL-MCNC: 1.9 MG/DL
MCHC RBC-ENTMCNC: 28.4 PG
MCHC RBC-ENTMCNC: 32.8 G/DL
MCV RBC AUTO: 86.7 FL
PLATELET # BLD AUTO: 240 K/UL
PMV BLD: 10.5 FL
POTASSIUM SERPL-SCNC: 4.8 MMOL/L
PROT SERPL-MCNC: 6.8 G/DL
RBC # BLD: 4.75 M/UL
RBC # FLD: 15.9 %
SODIUM SERPL-SCNC: 137 MMOL/L
WBC # FLD AUTO: 12.04 K/UL

## 2022-05-04 ENCOUNTER — NON-APPOINTMENT (OUTPATIENT)
Age: 77
End: 2022-05-04

## 2022-05-09 ENCOUNTER — LABORATORY RESULT (OUTPATIENT)
Age: 77
End: 2022-05-09

## 2022-05-09 ENCOUNTER — APPOINTMENT (OUTPATIENT)
Dept: INFUSION THERAPY | Facility: CLINIC | Age: 77
End: 2022-05-09

## 2022-05-09 RX ORDER — DEXAMETHASONE 0.5 MG/5ML
20 ELIXIR ORAL ONCE
Refills: 0 | Status: COMPLETED | OUTPATIENT
Start: 2022-05-09 | End: 2022-05-09

## 2022-05-09 RX ORDER — DIPHENHYDRAMINE HCL 50 MG
50 CAPSULE ORAL ONCE
Refills: 0 | Status: COMPLETED | OUTPATIENT
Start: 2022-05-09 | End: 2022-05-09

## 2022-05-09 RX ORDER — CARBOPLATIN 50 MG
150 VIAL (EA) INTRAVENOUS ONCE
Refills: 0 | Status: COMPLETED | OUTPATIENT
Start: 2022-05-09 | End: 2022-05-09

## 2022-05-09 RX ORDER — FAMOTIDINE 10 MG/ML
20 INJECTION INTRAVENOUS ONCE
Refills: 0 | Status: COMPLETED | OUTPATIENT
Start: 2022-05-09 | End: 2022-05-09

## 2022-05-09 RX ORDER — PACLITAXEL 6 MG/ML
85 INJECTION, SOLUTION, CONCENTRATE INTRAVENOUS ONCE
Refills: 0 | Status: COMPLETED | OUTPATIENT
Start: 2022-05-09 | End: 2022-05-09

## 2022-05-09 RX ORDER — IRON SUCROSE 20 MG/ML
200 INJECTION, SOLUTION INTRAVENOUS ONCE
Refills: 0 | Status: COMPLETED | OUTPATIENT
Start: 2022-05-09 | End: 2022-05-09

## 2022-05-09 RX ADMIN — Medication 102 MILLIGRAM(S): at 12:49

## 2022-05-09 RX ADMIN — Medication 126 MILLIGRAM(S): at 12:48

## 2022-05-09 RX ADMIN — IRON SUCROSE 220 MILLIGRAM(S): 20 INJECTION, SOLUTION INTRAVENOUS at 12:48

## 2022-05-09 RX ADMIN — FAMOTIDINE 104 MILLIGRAM(S): 10 INJECTION INTRAVENOUS at 12:49

## 2022-05-09 RX ADMIN — PACLITAXEL 264.17 MILLIGRAM(S): 6 INJECTION, SOLUTION, CONCENTRATE INTRAVENOUS at 13:18

## 2022-05-09 RX ADMIN — Medication 265 MILLIGRAM(S): at 14:32

## 2022-05-13 ENCOUNTER — APPOINTMENT (OUTPATIENT)
Dept: HEMATOLOGY ONCOLOGY | Facility: CLINIC | Age: 77
End: 2022-05-13
Payer: COMMERCIAL

## 2022-05-13 ENCOUNTER — LABORATORY RESULT (OUTPATIENT)
Age: 77
End: 2022-05-13

## 2022-05-13 VITALS
BODY MASS INDEX: 31.41 KG/M2 | HEIGHT: 60 IN | DIASTOLIC BLOOD PRESSURE: 73 MMHG | SYSTOLIC BLOOD PRESSURE: 170 MMHG | HEART RATE: 64 BPM | WEIGHT: 160 LBS | TEMPERATURE: 97.2 F

## 2022-05-13 PROCEDURE — 99213 OFFICE O/P EST LOW 20 MIN: CPT

## 2022-05-13 NOTE — REVIEW OF SYSTEMS
[Fatigue] : fatigue [Easy Bruising] : a tendency for easy bruising [Negative] : Cardiovascular [Diarrhea] : diarrhea [Recent Change In Weight] : ~T no recent weight change [Chest Pain] : no chest pain [Shortness Of Breath] : no shortness of breath [Skin Rash] : no skin rash [Easy Bleeding] : no tendency for easy bleeding [Swollen Glands] : no swollen glands [FreeTextEntry5] : r [FreeTextEntry7] : reports occasional heartburn ; occasional diarrhea, not new and unchanged in frequency

## 2022-05-13 NOTE — REASON FOR VISIT
[Follow-Up Visit] : a follow-up visit for [[employer] ] : provided by [employer]   [Interpreters_IDNumber] :  [id number] [[facility] DallastEntry3] :  [id number] [FreeTextEntry2] : Thrombocytopenia, self referred [TWNoteComboBox1] : Guatemalan

## 2022-05-13 NOTE — ASSESSMENT
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 Metz  \par - s/p evaluation by  [facility], Dr. Delon Huerta, for consultation regarding if any role for SURG intervention : consensus is to proceed w/ concurrent chemoradiation as per CROSS trial\par - followup with [facility], Dr. YENNI HILTON : continuing concurrent chemoRT\par - c/w week 4 of low dose carboplatin/paclitaxel with RT (likely 5-6 week duration) , initiated on 4.25.2022\par - Labwork today: CBC, BMP, LFTs, Mg \par \par # Prostate Cancer , Gaby Score 7 (3+4) , dx in 03/2022\par - PSA is 11.6ng/mL from 04/2022 , requested records including DVD imaging for our review\par - will scan pathology report into system \par - PET/CT imaging which shows uptake in prostate apex but no evidence of abnormal focal hypermetabolic activity in the distal esophagus with slight concentric wall thickening \par - will discuss options for management of prostate cancer further following intervention for newly diagnosed GE junction adenocarcinoma\par \par # H. Pylori , also noted on endoscopy from 03/2022\par - not currently taking abx\par - followup with GI, Dr. Garibay, for management of infection.  He is starting abx therapy.\par \par # Thrombocytopenia , possibly reactive\par - Patient reports h/o of long standing ITP, on Prednisone 5mg daily, platelet count fluctuates\par - Declined bone marrow biopsy on 7/7/2020\par - PT/PTT WNL on 5/16/2020\par - ironsat 10%, ferritin 111 : continue with IV venofer x 5 doses total\par \par # Leukocytosis, resolved\par - Patient reports h/o "polycythemia" requiring phlebotomies years ago, but not recently \par - Jak2, CALR, MPL, BCR/ABL negative\par - US of the spleen ordered on 7/8/2020, never done\par - will continue to monitor\par \par RTC in 2 weeks with CBC, BMP, LFTs, Mg level\par \par 78 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 Concerned about significant cardiac problems; Spoke to thoracic surgery Dr Qiu: not a surgical candidate, will administer definitive chemoradiation as per CROSS TRIAL\par - H.Pylori; reiterated importance for GI followup to start triple therapy (sent to Dr. Garibay) \par - ITP : on prednisone 5mg daily; not sure why; would re-evalaute this following completion of therapy

## 2022-05-13 NOTE — PHYSICAL EXAM
[Restricted in physically strenuous activity but ambulatory and able to carry out work of a light or sedentary nature] : Status 1- Restricted in physically strenuous activity but ambulatory and able to carry out work of a light or sedentary nature, e.g., light house work, office work [Normal] : affect appropriate [de-identified] : wears glasses [de-identified] : distended , midline abdominal scar  [de-identified] : ambulating with assistance of single point cane [de-identified] : small bruising on dorsal aspect of bilateral hands / forearms

## 2022-05-13 NOTE — HISTORY OF PRESENT ILLNESS
[Therapy: ___] : Therapy: [unfilled] [Cycle: ___] : Cycle: [unfilled] [FreeTextEntry1] : \par Started Carboplatin + Taxol on 4.25.2022 [de-identified] : 7/8/2020: Today apart from easy bruising he denies any other complaints. Denies recent weight loss, early satiety, bleeding GI or . He is aware his iron has been low in the past, he is not on oral iron supplementation. He is compliant with Prednisone and ASA. He declines BMBx at this time. \par \par 7/28/2020: No complaints today,except easy bruising. He continues to take low dose prednisone and ASA. He has not done the US of the spleen. We have reviewed bloodwork today from 7/8/2020, that reveals no evidence of Jak2, MPL, CALR or BCR/ABL. 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 \par \par 4/5/22\par Patient is here for a follow-up visit for newly diagnosed GE junction cancer, newly diagnosed prostate cancer, and history of leukocytosis/thrombocytosis using Iranian interpretation and accompanied by spouse via telephone.  He is transitioning care from another provider previously known to the clinic.  Reviewed most recent PET/CT imaging which shows uptake in prostate apex.  He had screening EGD + colonoscopy which noted abnormality.  Reviewed EUS done 3.29.2022 with Dr. 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  Also reviewed prostate biopsy which confirm prostate adenocarcinoma.  PSA was reportedly elevated but not available for review at initial visit.  Patient denies fever, chills, nausea, vomiting, dyspnea, dysphagia, unintentional weight loss or bleeding.  He still takes prednisone 5mg daily for hx of ITP.  \par PET/CT (3.31.2022 - [facility]) IMPRESSION:1.  Mildly increased activity in the left posterior prostate apex likely reflecting biopsy-proven malignancy.  2.  No evidence of abnormal focal hypermetabolic activity in the distal esophagus with slight concentric wall thickening. 3.  No FDG avid lymphadenopathy or distant metastasis. 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  Patient started chemotherapy with Carboplatin + Taxol as part of chemoRT on 4.25.2022.  Patient denies fever, chills, nausea, vomiting, dyspnea, dysphagia, changes in urination or bleeding.  He reports some fatigue.  He has not yet seen GI for management of H. pylori.  He continues to take prednisone 5mg daily for hx of ITP.  He takes Xarelto for venous issue of lower extremities but is unsure if ever had thrombosis.  \par \par 5/13/22\par Patient is here for a follow-up visit for GE junction cancer, prostate cancer, and history of leukocytosis/thrombocytosis using Iranian interpretation (ID# [id number]).  Patient started chemotherapy with Carboplatin + Taxol as part of chemoRT on 4.25.2022.  He is due for Week 4 of chemotherapy next week.  He states he has approximately 2 weeks more of RT planned.  He reports mild intermittent heartburn x 2-3 days and often starts to hiccup after meals.  He reports some fatigue.  Patient denies fever, chills, nausea, vomiting, dyspnea, dysphagia, neuropathy or bleeding.  He occasionally experiences diarrhea but this is not new and unchanged frequency.  He has seen GI for management of H. pylori and prescribed abx therapy to treat infection.  He continues to take prednisone 5mg daily for hx of ITP.  He takes Xarelto for venous issue of lower extremities but is unsure if ever had thrombosis.  He continues on IV Venofer, tolerating well.  [de-identified] : Irma is a dyana 74 yo gentleman with extensive hematologic history, but no records for review from his prior hematologists, all history is taken from the patient.  He has history of HTN, CAD, CABG s/p stent placement (on ASA and Xarelto) complicated by peritonitis who presents to clinic to establish care.  He reports that 15-20 years ago he was followed at [facility] and was diagnosed with "polycythemia" requiring phlebotomies, he has not required any phlebotomies for many years now. He also reports having been diagnosed with ITP, he reports his platelets were really low and he required high dose steroids, since he has been managed with Prednisone at 5mg daily for many years. During past several years his platelets have been hovering between 70K to low normal range.  He reports no history of bleeding, but recently he has developed easy bruising. He continues with daily baby ASA, he reports that previously he was taking Pradaxa for A fib, which was discontinued with easy bruisability onset.  Bruising has not improved with discontinuation of Pradaxa, but is mostly noted on dorsal aspect of bilateral upper extremities.  He reports that he has had bone marrow biopsy years ago, and is not keen on repeating the procedure now. He has been following with Dr. Srivastava over the past few years and now wishes to switch care. He reports no history of VTE or CVAs.  He also reports h/o L carotid stent. There was also consideration for R carotid intervention, this was never done. He has h/o PAD as well. He was last seen by [facility] in 2018.  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  Of note, patient also is being worked up for prostatic adenocarcinoma, Gaby 7.

## 2022-05-13 NOTE — CONSULT LETTER
[Dear  ___] : Dear  [unfilled], [Consult Letter:] : I had the pleasure of evaluating your patient, [unfilled]. [( Thank you for referring [unfilled] for consultation for _____ )] : Thank you for referring [unfilled] for consultation for [unfilled] [Please see my note below.] : Please see my note below. [Consult Closing:] : Thank you very much for allowing me to participate in the care of this patient.  If you have any questions, please do not hesitate to contact me. [Sincerely,] : Sincerely, [FreeTextEntry3] : Heriberto Shankar DO\par Attending Physician,\par Hematology/ Medical Oncology\par [phone] office\par \par

## 2022-05-16 ENCOUNTER — APPOINTMENT (OUTPATIENT)
Dept: INFUSION THERAPY | Facility: CLINIC | Age: 77
End: 2022-05-16

## 2022-05-16 RX ORDER — FAMOTIDINE 10 MG/ML
20 INJECTION INTRAVENOUS ONCE
Refills: 0 | Status: COMPLETED | OUTPATIENT
Start: 2022-05-16 | End: 2022-05-16

## 2022-05-16 RX ORDER — PACLITAXEL 6 MG/ML
85 INJECTION, SOLUTION, CONCENTRATE INTRAVENOUS ONCE
Refills: 0 | Status: COMPLETED | OUTPATIENT
Start: 2022-05-16 | End: 2022-05-16

## 2022-05-16 RX ORDER — DEXAMETHASONE 0.5 MG/5ML
20 ELIXIR ORAL ONCE
Refills: 0 | Status: COMPLETED | OUTPATIENT
Start: 2022-05-16 | End: 2022-05-16

## 2022-05-16 RX ORDER — IRON SUCROSE 20 MG/ML
200 INJECTION, SOLUTION INTRAVENOUS ONCE
Refills: 0 | Status: COMPLETED | OUTPATIENT
Start: 2022-05-16 | End: 2022-05-16

## 2022-05-16 RX ORDER — CARBOPLATIN 50 MG
140 VIAL (EA) INTRAVENOUS ONCE
Refills: 0 | Status: COMPLETED | OUTPATIENT
Start: 2022-05-16 | End: 2022-05-16

## 2022-05-16 RX ORDER — DIPHENHYDRAMINE HCL 50 MG
50 CAPSULE ORAL ONCE
Refills: 0 | Status: COMPLETED | OUTPATIENT
Start: 2022-05-16 | End: 2022-05-16

## 2022-05-16 RX ADMIN — PACLITAXEL 264.17 MILLIGRAM(S): 6 INJECTION, SOLUTION, CONCENTRATE INTRAVENOUS at 12:37

## 2022-05-16 RX ADMIN — FAMOTIDINE 104 MILLIGRAM(S): 10 INJECTION INTRAVENOUS at 11:08

## 2022-05-16 RX ADMIN — IRON SUCROSE 220 MILLIGRAM(S): 20 INJECTION, SOLUTION INTRAVENOUS at 11:08

## 2022-05-16 RX ADMIN — Medication 264 MILLIGRAM(S): at 12:38

## 2022-05-16 RX ADMIN — Medication 102 MILLIGRAM(S): at 11:08

## 2022-05-16 RX ADMIN — Medication 126 MILLIGRAM(S): at 11:08

## 2022-05-23 ENCOUNTER — APPOINTMENT (OUTPATIENT)
Dept: INFUSION THERAPY | Facility: CLINIC | Age: 77
End: 2022-05-23

## 2022-05-23 ENCOUNTER — LABORATORY RESULT (OUTPATIENT)
Age: 77
End: 2022-05-23

## 2022-05-23 LAB
ALBUMIN SERPL ELPH-MCNC: 4 G/DL
ALBUMIN SERPL ELPH-MCNC: 4.3 G/DL
ALP BLD-CCNC: 105 U/L
ALP BLD-CCNC: 86 U/L
ALT SERPL-CCNC: 17 U/L
ALT SERPL-CCNC: 18 U/L
ANION GAP SERPL CALC-SCNC: 12 MMOL/L
ANION GAP SERPL CALC-SCNC: 15 MMOL/L
AST SERPL-CCNC: 16 U/L
AST SERPL-CCNC: 22 U/L
BILIRUB DIRECT SERPL-MCNC: <0.2 MG/DL
BILIRUB INDIRECT SERPL-MCNC: >0.5 MG/DL
BILIRUB SERPL-MCNC: 0.3 MG/DL
BILIRUB SERPL-MCNC: 0.7 MG/DL
BUN SERPL-MCNC: 33 MG/DL
BUN SERPL-MCNC: 36 MG/DL
CALCIUM SERPL-MCNC: 8.7 MG/DL
CALCIUM SERPL-MCNC: 9.3 MG/DL
CHLORIDE SERPL-SCNC: 104 MMOL/L
CHLORIDE SERPL-SCNC: 107 MMOL/L
CO2 SERPL-SCNC: 17 MMOL/L
CO2 SERPL-SCNC: 20 MMOL/L
CREAT SERPL-MCNC: 1.3 MG/DL
CREAT SERPL-MCNC: 1.4 MG/DL
EGFR: 52 ML/MIN/1.73M2
EGFR: 57 ML/MIN/1.73M2
GLUCOSE SERPL-MCNC: 111 MG/DL
GLUCOSE SERPL-MCNC: 117 MG/DL
HCT VFR BLD CALC: 40 %
HCT VFR BLD CALC: 40.5 %
HCT VFR BLD CALC: 41.8 %
HGB BLD-MCNC: 13.3 G/DL
HGB BLD-MCNC: 13.4 G/DL
HGB BLD-MCNC: 13.7 G/DL
MAGNESIUM SERPL-MCNC: 1.9 MG/DL
MAGNESIUM SERPL-MCNC: 2.2 MG/DL
MCHC RBC-ENTMCNC: 28.5 PG
MCHC RBC-ENTMCNC: 28.8 PG
MCHC RBC-ENTMCNC: 28.9 PG
MCHC RBC-ENTMCNC: 32.8 G/DL
MCHC RBC-ENTMCNC: 33.1 G/DL
MCHC RBC-ENTMCNC: 33.3 G/DL
MCV RBC AUTO: 86.6 FL
MCV RBC AUTO: 86.9 FL
MCV RBC AUTO: 87.5 FL
PLATELET # BLD AUTO: 190 K/UL
PLATELET # BLD AUTO: 212 K/UL
PLATELET # BLD AUTO: 74 K/UL
PMV BLD: 10.3 FL
PMV BLD: 10.6 FL
PMV BLD: 9.9 FL
POTASSIUM SERPL-SCNC: 4.4 MMOL/L
POTASSIUM SERPL-SCNC: 5 MMOL/L
PROT SERPL-MCNC: 6.2 G/DL
PROT SERPL-MCNC: 6.8 G/DL
RBC # BLD: 4.62 M/UL
RBC # BLD: 4.63 M/UL
RBC # BLD: 4.81 M/UL
RBC # FLD: 17.3 %
RBC # FLD: 17.4 %
RBC # FLD: 18.8 %
SODIUM SERPL-SCNC: 136 MMOL/L
SODIUM SERPL-SCNC: 139 MMOL/L
WBC # FLD AUTO: 5 K/UL
WBC # FLD AUTO: 7.8 K/UL
WBC # FLD AUTO: 9.47 K/UL

## 2022-05-23 RX ORDER — CARBOPLATIN 50 MG
140 VIAL (EA) INTRAVENOUS ONCE
Refills: 0 | Status: COMPLETED | OUTPATIENT
Start: 2022-05-23 | End: 2022-05-23

## 2022-05-23 RX ORDER — DIPHENHYDRAMINE HCL 50 MG
50 CAPSULE ORAL ONCE
Refills: 0 | Status: COMPLETED | OUTPATIENT
Start: 2022-05-23 | End: 2022-05-23

## 2022-05-23 RX ORDER — IRON SUCROSE 20 MG/ML
200 INJECTION, SOLUTION INTRAVENOUS ONCE
Refills: 0 | Status: COMPLETED | OUTPATIENT
Start: 2022-05-23 | End: 2022-05-23

## 2022-05-23 RX ORDER — FAMOTIDINE 10 MG/ML
20 INJECTION INTRAVENOUS ONCE
Refills: 0 | Status: DISCONTINUED | OUTPATIENT
Start: 2022-05-23 | End: 2022-10-18

## 2022-05-23 RX ORDER — DEXAMETHASONE 0.5 MG/5ML
20 ELIXIR ORAL ONCE
Refills: 0 | Status: COMPLETED | OUTPATIENT
Start: 2022-05-23 | End: 2022-05-23

## 2022-05-23 RX ORDER — PACLITAXEL 6 MG/ML
85 INJECTION, SOLUTION, CONCENTRATE INTRAVENOUS ONCE
Refills: 0 | Status: COMPLETED | OUTPATIENT
Start: 2022-05-23 | End: 2022-05-23

## 2022-05-23 RX ADMIN — PACLITAXEL 264.17 MILLIGRAM(S): 6 INJECTION, SOLUTION, CONCENTRATE INTRAVENOUS at 12:32

## 2022-05-23 RX ADMIN — Medication 264 MILLIGRAM(S): at 12:32

## 2022-05-23 RX ADMIN — Medication 102 MILLIGRAM(S): at 11:10

## 2022-05-23 RX ADMIN — Medication 126 MILLIGRAM(S): at 11:10

## 2022-05-23 RX ADMIN — IRON SUCROSE 220 MILLIGRAM(S): 20 INJECTION, SOLUTION INTRAVENOUS at 11:10

## 2022-05-24 LAB
HCT VFR BLD CALC: 40.2 %
HGB BLD-MCNC: 13.5 G/DL
MCHC RBC-ENTMCNC: 29.3 PG
MCHC RBC-ENTMCNC: 33.6 G/DL
MCV RBC AUTO: 87.2 FL
PLATELET # BLD AUTO: 71 K/UL
PMV BLD: 10.7 FL
RBC # BLD: 4.61 M/UL
RBC # FLD: 18.9 %
WBC # FLD AUTO: 4.67 K/UL

## 2022-05-25 ENCOUNTER — APPOINTMENT (OUTPATIENT)
Dept: HEMATOLOGY ONCOLOGY | Facility: CLINIC | Age: 77
End: 2022-05-25

## 2022-05-25 ENCOUNTER — LABORATORY RESULT (OUTPATIENT)
Age: 77
End: 2022-05-25

## 2022-05-25 LAB
HCT VFR BLD CALC: 39.7 %
HGB BLD-MCNC: 13.2 G/DL
MCHC RBC-ENTMCNC: 28.9 PG
MCHC RBC-ENTMCNC: 33.2 G/DL
MCV RBC AUTO: 86.9 FL
PLATELET # BLD AUTO: 63 K/UL
PMV BLD: 11.7 FL
RBC # BLD: 4.57 M/UL
RBC # FLD: 19 %
WBC # FLD AUTO: 7.17 K/UL

## 2022-05-27 ENCOUNTER — LABORATORY RESULT (OUTPATIENT)
Age: 77
End: 2022-05-27

## 2022-05-27 ENCOUNTER — APPOINTMENT (OUTPATIENT)
Dept: HEMATOLOGY ONCOLOGY | Facility: CLINIC | Age: 77
End: 2022-05-27
Payer: COMMERCIAL

## 2022-05-27 VITALS
DIASTOLIC BLOOD PRESSURE: 54 MMHG | HEART RATE: 72 BPM | BODY MASS INDEX: 29.45 KG/M2 | WEIGHT: 150 LBS | SYSTOLIC BLOOD PRESSURE: 97 MMHG | HEIGHT: 60 IN | TEMPERATURE: 97.8 F

## 2022-05-27 LAB
ALBUMIN SERPL ELPH-MCNC: 3.9 G/DL
ALP BLD-CCNC: 78 U/L
ALT SERPL-CCNC: 29 U/L
ANION GAP SERPL CALC-SCNC: 15 MMOL/L
AST SERPL-CCNC: 20 U/L
BILIRUB DIRECT SERPL-MCNC: <0.2 MG/DL
BILIRUB INDIRECT SERPL-MCNC: >0.4 MG/DL
BILIRUB SERPL-MCNC: 0.6 MG/DL
BUN SERPL-MCNC: 46 MG/DL
CALCIUM SERPL-MCNC: 8.3 MG/DL
CHLORIDE SERPL-SCNC: 105 MMOL/L
CO2 SERPL-SCNC: 17 MMOL/L
CREAT SERPL-MCNC: 2.1 MG/DL
EGFR: 32 ML/MIN/1.73M2
GLUCOSE SERPL-MCNC: 138 MG/DL
HCT VFR BLD CALC: 40.7 %
HGB BLD-MCNC: 13.5 G/DL
MAGNESIUM SERPL-MCNC: 2 MG/DL
MCHC RBC-ENTMCNC: 29.1 PG
MCHC RBC-ENTMCNC: 33.2 G/DL
MCV RBC AUTO: 87.7 FL
PLATELET # BLD AUTO: 50 K/UL
PMV BLD: 9.6 FL
POTASSIUM SERPL-SCNC: 4.4 MMOL/L
PROT SERPL-MCNC: 6.2 G/DL
RBC # BLD: 4.64 M/UL
RBC # FLD: 19.1 %
SODIUM SERPL-SCNC: 137 MMOL/L
WBC # FLD AUTO: 2.98 K/UL

## 2022-05-27 PROCEDURE — 99215 OFFICE O/P EST HI 40 MIN: CPT

## 2022-05-27 NOTE — REVIEW OF SYSTEMS
[Fatigue] : fatigue [Diarrhea] : diarrhea [Easy Bruising] : a tendency for easy bruising [Negative] : Allergic/Immunologic [Recent Change In Weight] : ~T no recent weight change [Chest Pain] : no chest pain [Shortness Of Breath] : no shortness of breath [Skin Rash] : no skin rash [Easy Bleeding] : no tendency for easy bleeding [Swollen Glands] : no swollen glands [FreeTextEntry7] : reports occasional hiccups and abd discomfort ; occasional diarrhea, not new and unchanged in frequency

## 2022-05-27 NOTE — REASON FOR VISIT
[[employer] ] : provided by [employer]   [Follow-Up Visit] : a follow-up visit for [Interpreters_IDNumber] :  [id number] [FreeTextEntry2] : Thrombocytopenia, self referred [TWNoteComboBox1] : Armenian

## 2022-05-27 NOTE — PHYSICAL EXAM
[Restricted in physically strenuous activity but ambulatory and able to carry out work of a light or sedentary nature] : Status 1- Restricted in physically strenuous activity but ambulatory and able to carry out work of a light or sedentary nature, e.g., light house work, office work [Normal] : affect appropriate [de-identified] : wears glasses [de-identified] : distended , midline abdominal scar  [de-identified] : ambulating with assistance of single point cane [de-identified] : small bruising on dorsal aspect of bilateral hands / forearms

## 2022-05-27 NOTE — ASSESSMENT
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 Metz  \par - s/p evaluation by  [facility], Dr. Delon Huerta, for consultation regarding if any role for SURG intervention : consensus is to proceed w/ concurrent chemoradiation as per CROSS trial\par - followup with [facility], Dr. YENNI HILTON : continuing concurrent chemoRT\par - plan to continue week 6 of low dose carboplatin/paclitaxel with RT (likely 5-6 week duration) as long as counts are adequate , initiated on 4.25.2022\par - also plan to order repeat PET/CT to be done approx 3 months post completion of chemoRT\par - Labwork today: CBC, BMP, LFTs, Mg \par \par # Prostate Cancer , Gaby Score 7 (3+4) , dx in 03/2022\par - PSA is 11.6ng/mL from 04/2022 , requested records including DVD imaging for our review\par - will scan pathology report into system \par - PET/CT imaging which shows uptake in prostate apex but no evidence of abnormal focal hypermetabolic activity in the distal esophagus with slight concentric wall thickening \par - will discuss options for management of prostate cancer further following intervention for newly diagnosed GE junction adenocarcinoma\par \par # H. Pylori , also noted on endoscopy from 03/2022\par - not currently taking abx until completion of chemoRT\par - followup with GI, Dr. Garibay, for management of infection.  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, on Prednisone 5mg daily, platelet count fluctuates\par - Declined bone marrow biopsy on 7/7/2020\par - PT/PTT WNL on 5/16/2020\par - s/p IV Venofer x5, completed on 5.23.2022\par \par # Leukocytosis, resolved\par - Patient reports h/o "polycythemia" requiring phlebotomies years ago, but not recently \par - Jak2, CALR, MPL, BCR/ABL negative\par - US of the spleen ordered on 7/8/2020, never done\par - will continue to monitor\par \par RTC in 4 weeks with CBC, BMP, LFTs, Mg level\par \par seen/examined w/ NP Janessa; note reviewed case discussed\par \par 78 yo Ashkenazi Shinto man with ECOG 1-2 and significant cardiac history is diagnosed same time with prostate cancer, intermediate favorable risk, and adenocarcinoma of EG junction, [id number]\par - prostate cancer; PSA 11.6ng/mL ; however, will hold the decision on management at this time given that he has EGJ adenocarcinoma\par - EGJ adenocarcinoma, [id number]; spoke to GI and [facility] (referring physician Dr Yenni Hilton). Concerned about significant cardiac problems; Spoke to thoracic surgery Dr Qiu: not a surgical candidate, completeting  definitive chemoradiation as per CROSS TRIAL: end of the last RT dose on 6/3/22\par - ARF: called pt to go to ED: he refused; GFR decreased to 32, Encouraged to increase oral hydration; but still insisted he should go to ED\par - Thrombocytopenia, is likely due to chemo rather than RT; will repeat CBC prior to the last dose, doubt we would need to  hold either RT or chemo; communicated to [facility] TEAM\par - H.Pylori; reiterated importance for GI followup to start triple therapy (sent to Dr. Garibay) \par - ITP : on prednisone 5mg daily; not sure why; would re-evalaute this following completion of therapy

## 2022-05-27 NOTE — CONSULT LETTER
[Dear  ___] : Dear  [unfilled], [Consult Letter:] : I had the pleasure of evaluating your patient, [unfilled]. [( Thank you for referring [unfilled] for consultation for _____ )] : Thank you for referring [unfilled] for consultation for [unfilled] [Please see my note below.] : Please see my note below. [Consult Closing:] : Thank you very much for allowing me to participate in the care of this patient.  If you have any questions, please do not hesitate to contact me. [Sincerely,] : Sincerely, [DrAvinash  ___] : Dr. SAXENA [FreeTextEntry3] : Heriberto Shankar DO\par Attending Physician,\par Hematology/ Medical Oncology\par [phone] office\par \par  [DrAvinash ___] : Dr. SAXENA

## 2022-05-30 ENCOUNTER — EMERGENCY (EMERGENCY)
Facility: HOSPITAL | Age: 77
LOS: 0 days | Discharge: HOME | End: 2022-05-30
Attending: EMERGENCY MEDICINE | Admitting: EMERGENCY MEDICINE
Payer: MEDICARE

## 2022-05-30 VITALS
RESPIRATION RATE: 18 BRPM | DIASTOLIC BLOOD PRESSURE: 75 MMHG | SYSTOLIC BLOOD PRESSURE: 157 MMHG | OXYGEN SATURATION: 96 % | HEART RATE: 100 BPM | TEMPERATURE: 98 F

## 2022-05-30 DIAGNOSIS — R79.9 ABNORMAL FINDING OF BLOOD CHEMISTRY, UNSPECIFIED: ICD-10-CM

## 2022-05-30 DIAGNOSIS — Z98.890 OTHER SPECIFIED POSTPROCEDURAL STATES: Chronic | ICD-10-CM

## 2022-05-30 DIAGNOSIS — Z95.1 PRESENCE OF AORTOCORONARY BYPASS GRAFT: ICD-10-CM

## 2022-05-30 DIAGNOSIS — Z87.891 PERSONAL HISTORY OF NICOTINE DEPENDENCE: ICD-10-CM

## 2022-05-30 DIAGNOSIS — Z85.46 PERSONAL HISTORY OF MALIGNANT NEOPLASM OF PROSTATE: ICD-10-CM

## 2022-05-30 DIAGNOSIS — N17.9 ACUTE KIDNEY FAILURE, UNSPECIFIED: ICD-10-CM

## 2022-05-30 DIAGNOSIS — Z85.01 PERSONAL HISTORY OF MALIGNANT NEOPLASM OF ESOPHAGUS: ICD-10-CM

## 2022-05-30 DIAGNOSIS — Z95.1 PRESENCE OF AORTOCORONARY BYPASS GRAFT: Chronic | ICD-10-CM

## 2022-05-30 DIAGNOSIS — I10 ESSENTIAL (PRIMARY) HYPERTENSION: ICD-10-CM

## 2022-05-30 LAB
ALBUMIN SERPL ELPH-MCNC: 3.9 G/DL — SIGNIFICANT CHANGE UP (ref 3.5–5.2)
ALP SERPL-CCNC: 75 U/L — SIGNIFICANT CHANGE UP (ref 30–115)
ALT FLD-CCNC: 32 U/L — SIGNIFICANT CHANGE UP (ref 0–41)
ANION GAP SERPL CALC-SCNC: 15 MMOL/L — HIGH (ref 7–14)
AST SERPL-CCNC: 28 U/L — SIGNIFICANT CHANGE UP (ref 0–41)
BASOPHILS # BLD AUTO: 0.01 K/UL — SIGNIFICANT CHANGE UP (ref 0–0.2)
BASOPHILS NFR BLD AUTO: 0.5 % — SIGNIFICANT CHANGE UP (ref 0–1)
BILIRUB SERPL-MCNC: 0.5 MG/DL — SIGNIFICANT CHANGE UP (ref 0.2–1.2)
BUN SERPL-MCNC: 34 MG/DL — HIGH (ref 10–20)
CALCIUM SERPL-MCNC: 8.7 MG/DL — SIGNIFICANT CHANGE UP (ref 8.5–10.1)
CHLORIDE SERPL-SCNC: 98 MMOL/L — SIGNIFICANT CHANGE UP (ref 98–110)
CO2 SERPL-SCNC: 19 MMOL/L — SIGNIFICANT CHANGE UP (ref 17–32)
CREAT SERPL-MCNC: 1.7 MG/DL — HIGH (ref 0.7–1.5)
EGFR: 41 ML/MIN/1.73M2 — LOW
EOSINOPHIL # BLD AUTO: 0.02 K/UL — SIGNIFICANT CHANGE UP (ref 0–0.7)
EOSINOPHIL NFR BLD AUTO: 1 % — SIGNIFICANT CHANGE UP (ref 0–8)
GLUCOSE SERPL-MCNC: 120 MG/DL — HIGH (ref 70–99)
HCT VFR BLD CALC: 40 % — LOW (ref 42–52)
HGB BLD-MCNC: 13.9 G/DL — LOW (ref 14–18)
IMM GRANULOCYTES NFR BLD AUTO: 0.5 % — HIGH (ref 0.1–0.3)
LYMPHOCYTES # BLD AUTO: 0.89 K/UL — LOW (ref 1.2–3.4)
LYMPHOCYTES # BLD AUTO: 43.4 % — SIGNIFICANT CHANGE UP (ref 20.5–51.1)
MCHC RBC-ENTMCNC: 29.4 PG — SIGNIFICANT CHANGE UP (ref 27–31)
MCHC RBC-ENTMCNC: 34.8 G/DL — SIGNIFICANT CHANGE UP (ref 32–37)
MCV RBC AUTO: 84.7 FL — SIGNIFICANT CHANGE UP (ref 80–94)
MONOCYTES # BLD AUTO: 0.25 K/UL — SIGNIFICANT CHANGE UP (ref 0.1–0.6)
MONOCYTES NFR BLD AUTO: 12.2 % — HIGH (ref 1.7–9.3)
NEUTROPHILS # BLD AUTO: 0.87 K/UL — LOW (ref 1.4–6.5)
NEUTROPHILS NFR BLD AUTO: 42.4 % — SIGNIFICANT CHANGE UP (ref 42.2–75.2)
NRBC # BLD: 0 /100 WBCS — SIGNIFICANT CHANGE UP (ref 0–0)
PLATELET # BLD AUTO: 53 K/UL — LOW (ref 130–400)
POTASSIUM SERPL-MCNC: 4.1 MMOL/L — SIGNIFICANT CHANGE UP (ref 3.5–5)
POTASSIUM SERPL-SCNC: 4.1 MMOL/L — SIGNIFICANT CHANGE UP (ref 3.5–5)
PROT SERPL-MCNC: 6.3 G/DL — SIGNIFICANT CHANGE UP (ref 6–8)
RBC # BLD: 4.72 M/UL — SIGNIFICANT CHANGE UP (ref 4.7–6.1)
RBC # FLD: 18.7 % — HIGH (ref 11.5–14.5)
SODIUM SERPL-SCNC: 132 MMOL/L — LOW (ref 135–146)
WBC # BLD: 2.05 K/UL — LOW (ref 4.8–10.8)
WBC # FLD AUTO: 2.05 K/UL — LOW (ref 4.8–10.8)

## 2022-05-30 PROCEDURE — 99284 EMERGENCY DEPT VISIT MOD MDM: CPT

## 2022-05-30 RX ORDER — AZILSARTAN KAMEDOXOMIL AND CHLORTHALIDONE 40; 12.5 MG/1; MG/1
1 TABLET ORAL
Qty: 0 | Refills: 0 | DISCHARGE

## 2022-05-30 RX ORDER — SODIUM CHLORIDE 9 MG/ML
1000 INJECTION, SOLUTION INTRAVENOUS ONCE
Refills: 0 | Status: COMPLETED | OUTPATIENT
Start: 2022-05-30 | End: 2022-05-30

## 2022-05-30 RX ORDER — CARVEDILOL PHOSPHATE 80 MG/1
1 CAPSULE, EXTENDED RELEASE ORAL
Qty: 0 | Refills: 0 | DISCHARGE

## 2022-05-30 RX ORDER — AMLODIPINE BESYLATE 2.5 MG/1
1 TABLET ORAL
Qty: 0 | Refills: 0 | DISCHARGE

## 2022-05-30 RX ORDER — RIVAROXABAN 15 MG-20MG
1 KIT ORAL
Qty: 0 | Refills: 0 | DISCHARGE

## 2022-05-30 RX ADMIN — SODIUM CHLORIDE 1000 MILLILITER(S): 9 INJECTION, SOLUTION INTRAVENOUS at 19:08

## 2022-05-30 RX ADMIN — SODIUM CHLORIDE 1000 MILLILITER(S): 9 INJECTION, SOLUTION INTRAVENOUS at 18:56

## 2022-05-30 NOTE — ED PROVIDER NOTE - CARE PROVIDER_API CALL
Heriberto Shankar (DO)  Hematology; Internal Medicine; Medical Oncology  [address] [place] [address]  Phone: [phone]  Fax: [phone]  Follow Up Time: 4-6 Days

## 2022-05-30 NOTE — ED PROVIDER NOTE - PROGRESS NOTE DETAILS
CP: offered patient admission for RACHELE work up and patient refused. He will be seeing Dr. Esteves this week. Given strict return precautions and follow up. Patient verbalized understanding and is agreeable to plan.

## 2022-05-30 NOTE — ED PROVIDER NOTE - PATIENT PORTAL LINK FT
You can access the FollowMyHealth Patient Portal offered by [facility] by registering at the following website: [email or web address]. By joining [employer]’s FollowMyHealth portal, you will also be able to view your health information using other applications (apps) compatible with our system.

## 2022-05-30 NOTE — ED PROVIDER NOTE - ATTENDING CONTRIBUTION TO CARE
77M PMH HTN, G-E junction carcinoma on chemo/radiation last was friday, prostate ca, sent for RACHELE. pt states he had bloodwork on 5/27 and was called on friday by his oncologist Dr Chun that his Cr is elevated and needs to come to ED for IVF. Pt denies hx of CKD. Has no complaints and states "I feel fine." no fever, cough. no cp, sob. no abd pain, nvdc. no dysuria, freq, hematuria, flank pain. has appt for chemo on 6/2. doesn't want to wait for blood results or admission or repeat labs, just wants IVF and dc home. has been eating well, doesn't feel dehydrated.     on exam, AFVSS, well xavier nad, ncat, eomi, perrla, mmm, lctab, rrr nl s1s2 no mrg, abd soft ntnd, no cvat, aaox3, no focal deficits, no le edema or calf ttp,    a/p; RACHELE, unclear cause, labs, ivf re-eval

## 2022-05-30 NOTE — ED PROVIDER NOTE - PHYSICAL EXAMINATION
CONSTITUTIONAL: Well-developed; well-nourished; in no acute distress.   SKIN: warm, dry  HEAD: Normocephalic  EYES: no conjunctival injection.  ENT: No nasal discharge  NECK: Supple  CARD: S1, S2 normal; Regular rate and rhythm.   RESP: No wheezes, rales or rhonchi.  ABD: soft ntnd  EXT: Normal ROM.  No clubbing, cyanosis or edema.   NEURO: Alert, oriented, grossly unremarkable.  PSYCH: Cooperative, appropriate.

## 2022-05-30 NOTE — ED PROVIDER NOTE - NSFOLLOWUPINSTRUCTIONS_ED_ALL_ED_FT
Acute Kidney Injury    WHAT YOU NEED TO KNOW:    What is acute kidney injury? Acute kidney injury (RACHELE) is also called acute kidney failure, or acute renal failure. RACHELE happens when your kidneys suddenly stop working correctly. Normally, the kidneys remove fluid, chemicals, and waste from your blood. These wastes are turned into urine by your kidneys. RACHELE usually happens over hours or days. When you have RACHELE, your kidneys do not remove the waste, chemicals, or extra fluid from your body. A normal amount of urine is not produced. RACHELE is usually temporary, but it may become a chronic kidney condition.     What causes RACHELE?   •Decreased blood flow to the kidney, such as from hypercalcemia (high blood calcium level) or severe heart disease       •A disease or condition that affects the kidneys, such as hypertension (high blood pressure) or diabetes       •A blockage in the kidney or ureter, such as a kidney or bladder stone, enlarged prostate, or tumor       What increases my risk for RACHELE?   •Being hospitalized with a serious illness, such as sepsis or severe burns      •Peripheral artery disease      •Older age in adults      •Kidney or liver diseases      •Medical conditions such as dehydration, hypertension, diabetes, or heart failure      •Certain medicines such as NSAIDs      What are the signs and symptoms of RACHELE? You may not have any symptoms with early or mild RACHELE. As RACHELE progresses, you may have any of the following:   •Decrease in the amount of urine or no urination      •Swelling in your arms, legs, or feet       •Weakness, drowsiness, or no appetite      •Nausea, flank pain, muscle twitching or muscle cramps      •Itchy skin, or your breath or body smells like urine      •Behavior changes, confusion, disorientation, or seizures      How is RACHELE diagnosed? There are many causes of RACHELE. To find the cause and to treat your RACHELE correctly, your healthcare provider may do any of the following:   •Blood and urine tests show how well your kidneys are working. They may also show the cause of your RACHELE.       •An x-ray or ultrasound may show problems with your kidneys. Your healthcare provider may see a blockage in your kidneys. He or she may see narrowing of the artery that sends blood to your kidneys. You may be given contrast liquid to help your kidneys show up better in the pictures. Tell the healthcare provider if you have ever had an allergic reaction to contrast liquid.       How is RACHELE treated? Treatment depends upon the cause of your acute kidney injury and how severe it is. Usually, RACHELE will be monitored in the hospital. If you have mild RACHELE, you may be able to go home to recover. Your healthcare providers will treat the cause of your RACHELE. You may need IV fluids if your RACHELE was caused by little or no fluid in your body. You may need dialysis to remove waste and extra fluid from your body. Your healthcare provider may tell you to eat food low in sodium (salt), potassium, phosphorus, or protein. You may need to see a dietitian before you are discharged to get help with planning your meals.     How can I prevent RACHELE?   •Manage other health conditions such as diabetes, high blood pressure, or heart disease. These conditions increase your risk for acute kidney injury. Take your medicines for these conditions as directed. Also, monitor your blood sugar and blood pressure levels as directed. Contact your healthcare provider if your levels are not in the range he or she says it should be.      •Talk to your healthcare provider before you take over-the-counter-medicine. NSAIDs, stomach medicine, or laxatives may harm your kidneys and increase your risk for acute kidney injury. If it is okay to take the medicine, follow the directions on the package. Do not take more than directed.       •Tell healthcare providers if you have had RACHELE before you get contrast liquid for an x-ray or CT scan. Your healthcare provider may give you medicine to prevent kidney problems caused by the liquid.       CARE AGREEMENT:    You have the right to help plan your care. Learn about your health condition and how it may be treated. Discuss treatment options with your healthcare providers to decide what care you want to receive. You always have the right to refuse treatment.

## 2022-05-30 NOTE — ED ADULT NURSE NOTE - NSICDXPASTMEDICALHX_GEN_ALL_CORE_FT
PAST MEDICAL HISTORY:  Esophageal cancer IV chemo (last 5/23/22) and radiation (last on 5/27/22    Hypertension

## 2022-05-30 NOTE — ED ADULT NURSE NOTE - OBJECTIVE STATEMENT
The patient is a 77y Male complaining of abnormal lab result. Sent in by MD for iv fluids, states he was told his creatinine is high

## 2022-05-30 NOTE — ED PROVIDER NOTE - OBJECTIVE STATEMENT
78 yo male, PMHx of GE junction carcinoma, prostate cancer, HTN, presents for abnormal lab result. He states he is followed by Dr. Joseph who called him and said his creatinine was elevated and he should go to ED for IV fluids. Patient has no complaints at this time. Denies fevers, chills, nausea, vomiting, abdominal pain, headache, dizziness, bruising, chest pain, shortness of breath.

## 2022-05-30 NOTE — ED PROVIDER NOTE - NS ED ROS FT
GEN:  no fever, no chills, no generalized weakness  NEURO:  no headache, no dizziness  ENT: no sore throat, no runny nose  CV:  no chest pain, no palpitations  RESP:  no sob, no cough  GI:  no nausea, no vomiting, no abdominal pain, no diarrhea  :  no dysuria, no urinary frequency, no hematuria  MSK:  no joint pain, no edema  SKIN:  no rash, no bruising

## 2022-06-02 ENCOUNTER — APPOINTMENT (OUTPATIENT)
Dept: INFUSION THERAPY | Facility: CLINIC | Age: 77
End: 2022-06-02

## 2022-06-02 ENCOUNTER — LABORATORY RESULT (OUTPATIENT)
Age: 77
End: 2022-06-02

## 2022-06-02 DIAGNOSIS — C61 MALIGNANT NEOPLASM OF PROSTATE: ICD-10-CM

## 2022-06-02 PROBLEM — I10 ESSENTIAL (PRIMARY) HYPERTENSION: Chronic | Status: ACTIVE | Noted: 2022-05-30

## 2022-06-02 PROBLEM — C15.9 MALIGNANT NEOPLASM OF ESOPHAGUS, UNSPECIFIED: Chronic | Status: ACTIVE | Noted: 2022-05-30

## 2022-06-02 LAB
ALBUMIN SERPL ELPH-MCNC: 3.5 G/DL
ALP BLD-CCNC: 76 U/L
ALT SERPL-CCNC: 30 U/L
ANION GAP SERPL CALC-SCNC: 14 MMOL/L
AST SERPL-CCNC: 25 U/L
BILIRUB DIRECT SERPL-MCNC: <0.2 MG/DL
BILIRUB INDIRECT SERPL-MCNC: >0.4 MG/DL
BILIRUB SERPL-MCNC: 0.6 MG/DL
BUN SERPL-MCNC: 34 MG/DL
CALCIUM SERPL-MCNC: 8.4 MG/DL
CHLORIDE SERPL-SCNC: 106 MMOL/L
CO2 SERPL-SCNC: 20 MMOL/L
CREAT SERPL-MCNC: 1.8 MG/DL
EGFR: 39 ML/MIN/1.73M2
GLUCOSE SERPL-MCNC: 96 MG/DL
HCT VFR BLD CALC: 34.6 %
HGB BLD-MCNC: 11.9 G/DL
MAGNESIUM SERPL-MCNC: 1.7 MG/DL
MCHC RBC-ENTMCNC: 29.4 PG
MCHC RBC-ENTMCNC: 34.4 G/DL
MCV RBC AUTO: 85.4 FL
PLATELET # BLD AUTO: 67 K/UL
PMV BLD: 9.9 FL
POTASSIUM SERPL-SCNC: 4.5 MMOL/L
PROT SERPL-MCNC: 5.8 G/DL
RBC # BLD: 4.05 M/UL
RBC # FLD: 19 %
SODIUM SERPL-SCNC: 140 MMOL/L
WBC # FLD AUTO: 2.66 K/UL

## 2022-06-02 RX ORDER — FAMOTIDINE 10 MG/ML
20 INJECTION INTRAVENOUS ONCE
Refills: 0 | Status: COMPLETED | OUTPATIENT
Start: 2022-06-02 | End: 2022-06-02

## 2022-06-02 RX ORDER — DEXAMETHASONE 0.5 MG/5ML
20 ELIXIR ORAL ONCE
Refills: 0 | Status: COMPLETED | OUTPATIENT
Start: 2022-06-02 | End: 2022-06-02

## 2022-06-02 RX ORDER — SODIUM CHLORIDE 9 MG/ML
1000 INJECTION, SOLUTION INTRAVENOUS
Refills: 0 | Status: DISCONTINUED | OUTPATIENT
Start: 2022-06-02 | End: 2022-10-18

## 2022-06-02 RX ORDER — PACLITAXEL 6 MG/ML
85 INJECTION, SOLUTION, CONCENTRATE INTRAVENOUS ONCE
Refills: 0 | Status: COMPLETED | OUTPATIENT
Start: 2022-06-02 | End: 2022-06-02

## 2022-06-02 RX ORDER — DIPHENHYDRAMINE HCL 50 MG
50 CAPSULE ORAL ONCE
Refills: 0 | Status: COMPLETED | OUTPATIENT
Start: 2022-06-02 | End: 2022-06-02

## 2022-06-02 RX ORDER — CARBOPLATIN 50 MG
125 VIAL (EA) INTRAVENOUS ONCE
Refills: 0 | Status: DISCONTINUED | OUTPATIENT
Start: 2022-06-02 | End: 2022-10-18

## 2022-06-02 RX ADMIN — PACLITAXEL 264.17 MILLIGRAM(S): 6 INJECTION, SOLUTION, CONCENTRATE INTRAVENOUS at 12:13

## 2022-06-02 RX ADMIN — FAMOTIDINE 104 MILLIGRAM(S): 10 INJECTION INTRAVENOUS at 11:18

## 2022-06-02 RX ADMIN — Medication 102 MILLIGRAM(S): at 11:18

## 2022-06-02 RX ADMIN — Medication 126 MILLIGRAM(S): at 11:18

## 2022-06-09 ENCOUNTER — APPOINTMENT (OUTPATIENT)
Dept: INFUSION THERAPY | Facility: CLINIC | Age: 77
End: 2022-06-09

## 2022-06-09 LAB
ANION GAP SERPL CALC-SCNC: 13 MMOL/L
BUN SERPL-MCNC: 27 MG/DL
CALCIUM SERPL-MCNC: 9.2 MG/DL
CHLORIDE SERPL-SCNC: 106 MMOL/L
CO2 SERPL-SCNC: 20 MMOL/L
CREAT SERPL-MCNC: 1.6 MG/DL
EGFR: 44 ML/MIN/1.73M2
GLUCOSE SERPL-MCNC: 114 MG/DL
MAGNESIUM SERPL-MCNC: 1.6 MG/DL
POTASSIUM SERPL-SCNC: 4.2 MMOL/L
SODIUM SERPL-SCNC: 139 MMOL/L

## 2022-06-09 RX ORDER — SODIUM CHLORIDE 9 MG/ML
1000 INJECTION INTRAMUSCULAR; INTRAVENOUS; SUBCUTANEOUS
Refills: 0 | Status: DISCONTINUED | OUTPATIENT
Start: 2022-06-09 | End: 2022-10-18

## 2022-06-09 RX ORDER — MAGNESIUM SULFATE 500 MG/ML
2 VIAL (ML) INJECTION ONCE
Refills: 0 | Status: COMPLETED | OUTPATIENT
Start: 2022-06-09 | End: 2022-06-09

## 2022-06-09 RX ADMIN — SODIUM CHLORIDE 1000 MILLILITER(S): 9 INJECTION INTRAMUSCULAR; INTRAVENOUS; SUBCUTANEOUS at 13:20

## 2022-06-09 RX ADMIN — Medication 2 GRAM(S): at 13:35

## 2022-06-09 RX ADMIN — Medication 25 GRAM(S): at 12:35

## 2022-06-09 RX ADMIN — SODIUM CHLORIDE 333 MILLILITER(S): 9 INJECTION INTRAMUSCULAR; INTRAVENOUS; SUBCUTANEOUS at 10:55

## 2022-06-14 ENCOUNTER — LABORATORY RESULT (OUTPATIENT)
Age: 77
End: 2022-06-14

## 2022-06-14 ENCOUNTER — APPOINTMENT (OUTPATIENT)
Dept: HEMATOLOGY ONCOLOGY | Facility: CLINIC | Age: 77
End: 2022-06-14

## 2022-06-14 PROBLEM — C61 PROSTATE CANCER: Status: ACTIVE | Noted: 2022-04-05

## 2022-06-14 LAB
HCT VFR BLD CALC: 36.8 %
HGB BLD-MCNC: 12.2 G/DL
MCHC RBC-ENTMCNC: 29.3 PG
MCHC RBC-ENTMCNC: 33.2 G/DL
MCV RBC AUTO: 88.5 FL
PLATELET # BLD AUTO: 234 K/UL
PMV BLD: 10.3 FL
RBC # BLD: 4.16 M/UL
RBC # FLD: 21.3 %
WBC # FLD AUTO: 10.65 K/UL

## 2022-06-16 ENCOUNTER — APPOINTMENT (OUTPATIENT)
Dept: HEMATOLOGY ONCOLOGY | Facility: CLINIC | Age: 77
End: 2022-06-16

## 2022-06-16 ENCOUNTER — APPOINTMENT (OUTPATIENT)
Dept: INFUSION THERAPY | Facility: CLINIC | Age: 77
End: 2022-06-16

## 2022-06-21 ENCOUNTER — LABORATORY RESULT (OUTPATIENT)
Age: 77
End: 2022-06-21

## 2022-06-21 ENCOUNTER — APPOINTMENT (OUTPATIENT)
Dept: HEMATOLOGY ONCOLOGY | Facility: CLINIC | Age: 77
End: 2022-06-21
Payer: COMMERCIAL

## 2022-06-21 VITALS
DIASTOLIC BLOOD PRESSURE: 85 MMHG | HEIGHT: 60 IN | HEART RATE: 76 BPM | TEMPERATURE: 97.5 F | BODY MASS INDEX: 28.07 KG/M2 | SYSTOLIC BLOOD PRESSURE: 146 MMHG | WEIGHT: 143 LBS | RESPIRATION RATE: 20 BRPM

## 2022-06-21 DIAGNOSIS — C16.0 MALIGNANT NEOPLASM OF CARDIA: ICD-10-CM

## 2022-06-21 DIAGNOSIS — C61 MALIGNANT NEOPLASM OF PROSTATE: ICD-10-CM

## 2022-06-21 DIAGNOSIS — N17.9 ACUTE KIDNEY FAILURE, UNSPECIFIED: ICD-10-CM

## 2022-06-21 LAB
ALBUMIN SERPL ELPH-MCNC: 4.2 G/DL
ALP BLD-CCNC: 116 U/L
ALT SERPL-CCNC: 28 U/L
ANION GAP SERPL CALC-SCNC: 10 MMOL/L
AST SERPL-CCNC: 23 U/L
BILIRUB DIRECT SERPL-MCNC: <0.2 MG/DL
BILIRUB INDIRECT SERPL-MCNC: >0.1 MG/DL
BILIRUB SERPL-MCNC: 0.3 MG/DL
BUN SERPL-MCNC: 44 MG/DL
CALCIUM SERPL-MCNC: 9.2 MG/DL
CHLORIDE SERPL-SCNC: 105 MMOL/L
CO2 SERPL-SCNC: 22 MMOL/L
CREAT SERPL-MCNC: 1.6 MG/DL
EGFR: 44 ML/MIN/1.73M2
GLUCOSE SERPL-MCNC: 101 MG/DL
HCT VFR BLD CALC: 36.2 %
HGB BLD-MCNC: 11.8 G/DL
MAGNESIUM SERPL-MCNC: 2 MG/DL
MCHC RBC-ENTMCNC: 29.6 PG
MCHC RBC-ENTMCNC: 32.6 G/DL
MCV RBC AUTO: 90.7 FL
PLATELET # BLD AUTO: 241 K/UL
PMV BLD: 9.9 FL
POTASSIUM SERPL-SCNC: 4.3 MMOL/L
PROT SERPL-MCNC: 6.7 G/DL
RBC # BLD: 3.99 M/UL
RBC # FLD: 22.6 %
SODIUM SERPL-SCNC: 137 MMOL/L
WBC # FLD AUTO: 14.53 K/UL

## 2022-06-21 PROCEDURE — 99214 OFFICE O/P EST MOD 30 MIN: CPT

## 2022-06-21 NOTE — ASSESSMENT
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 Metz  \par - s/p evaluation by  [facility], Dr. Delon Huerta, for consultation regarding if any role for SURG intervention : consensus is to proceed w/ concurrent chemoradiation as per CROSS trial\par - followup with [facility], Dr. YENNI HILTON : continuing concurrent chemoRT until 6/24\par - plan to administer *FINAL* week #7 of low dose carboplatin/paclitaxel on 6/24 with RT (likely 5-6 week duration) as long as counts are adequate , initiated on 4.25.2022\par - also plan to order repeat PET/CT to be done approx 3 months post completion of chemoRT (~09/2022)\par - Labwork today: CBC, BMP, LFTs, Mg \par \par # RACHELE\par - s/p IVF 1L NS on 6/9 ; encouraged aggressive oral hydration at home as tolerated\par - recheck labwork today\par - will schedule for hydration of 1L NS over 2 hours on Friday 6/24 then weekly x 3 with labwork (CBC, BMP, Mg STAT)\par \par # Prostate Cancer , Sycamore Score 7 (3+4) , dx in 03/2022\par - PSA is 11.6ng/mL from 04/2022 , requested records including DVD imaging for our review\par - will scan pathology report into system \par - PET/CT imaging which shows uptake in prostate apex but no evidence of abnormal focal hypermetabolic activity in the distal esophagus with slight concentric wall thickening \par - will discuss options for management of prostate cancer further following intervention for newly diagnosed GE junction adenocarcinoma\par \par # H. Pylori , also noted on endoscopy from 03/2022\par - not currently taking abx until completion of chemoRT\par - followup with GI, Dr. Garibay, for management of infection.  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, on Prednisone 5mg daily, platelet count fluctuates\par - Declined bone marrow biopsy on 7/7/2020\par - PT/PTT WNL on 5/16/2020\par - s/p IV Venofer x5, completed on 5.23.2022\par \par # Leukocytosis, resolved\par - Patient reports h/o "polycythemia" requiring phlebotomies years ago, but not recently \par - Jak2, CALR, MPL, BCR/ABL negative\par - US of the spleen ordered on 7/8/2020, never done\par - will continue to monitor\par \par RTC in 4 weeks with CBC, BMP, LFTs, Mg level 2 days prior \par \par 78 yo Ashkenazi Jain man with ECOG 1-2 and significant cardiac history is diagnosed same time with prostate cancer, intermediate favorable risk, and adenocarcinoma of EG junction, [id number]\par - prostate cancer; PSA 11.6ng/mL ; however, will hold the decision on management at this time given that he has EGJ adenocarcinoma\par - EGJ adenocarcinoma, [id number]; spoke to GI and [facility] (referring physician Dr Yenni Hilton). Concerned about significant cardiac problems; Spoke to thoracic surgery Dr Qiu: not a surgical candidate, completeting  definitive chemoradiation as per CROSS TRIAL: end of the last RT dose on 6/3/22\par - ARF: called pt to go to ED: he refused; GFR decreased to 32, Encouraged to increase oral hydration; but still insisted he should go to ED\par - Thrombocytopenia, is likely due to chemo rather than RT; will repeat CBC prior to the last dose, doubt we would need to  hold either RT or chemo; communicated to [facility] TEAM\par - H.Pylori; reiterated importance for GI followup to start triple therapy (sent to Dr. Garibay) \par - ITP : on prednisone 5mg daily; not sure why; would re-evaluate this following completion of therapy\par \par seen/examined w/ NP Janessa; note reviewed; case discussed\par - completing RT and chemo this week: \par - IVF\par - f/u in one month, and labs weekly\par - will rediscuss prostate cancer therapy when he recovers after chemoRT for EGJ cancer\par

## 2022-06-21 NOTE — REASON FOR VISIT
[[employer] ] : provided by [employer]   [Follow-Up Visit] : a follow-up [Interpreters_IDNumber] :  [id number] [FreeTextEntry2] : Thrombocytopenia, self referred [TWNoteComboBox1] : [place] Islander

## 2022-06-21 NOTE — PHYSICAL EXAM
[Restricted in physically strenuous activity but ambulatory and able to carry out work of a light or sedentary nature] : Status 1- Restricted in physically strenuous activity but ambulatory and able to carry out work of a light or sedentary nature, e.g., light house work, office work [Normal] : affect appropriate [de-identified] : wears glasses [de-identified] : distended , midline abdominal scar  [de-identified] : ambulating with assistance of single point cane [de-identified] : small bruising on dorsal aspect of bilateral hands / forearms

## 2022-06-21 NOTE — HISTORY OF PRESENT ILLNESS
[Therapy: ___] : Therapy: [unfilled] [FreeTextEntry1] : \par Started Carboplatin + Taxol on 4.25.2022 [de-identified] : 7/8/2020: Today apart from easy bruising he denies any other complaints. Denies recent weight loss, early satiety, bleeding GI or . He is aware his iron has been low in the past, he is not on oral iron supplementation. He is compliant with Prednisone and ASA. He declines BMBx at this time. \par \par 7/28/2020: No complaints today,except easy bruising. He continues to take low dose prednisone and ASA. He has not done the US of the spleen. We have reviewed bloodwork today from 7/8/2020, that reveals no evidence of Jak2, MPL, CALR or BCR/ABL. 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 \par \par 4/5/22\par Patient is here for a follow-up visit for newly diagnosed GE junction cancer, newly diagnosed prostate cancer, and history of leukocytosis/thrombocytosis using [place]ian interpretation and accompanied by spouse via telephone.  He is transitioning care from another provider previously known to the clinic.  Reviewed most recent PET/CT imaging which shows uptake in prostate apex.  He had screening EGD + colonoscopy which noted abnormality.  Reviewed EUS done 3.29.2022 with Dr. 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  Also reviewed prostate biopsy which confirm prostate adenocarcinoma.  PSA was reportedly elevated but not available for review at initial visit.  Patient denies fever, chills, nausea, vomiting, dyspnea, dysphagia, unintentional weight loss or bleeding.  He still takes prednisone 5mg daily for hx of ITP.  \par PET/CT (3.31.2022 - [facility]) IMPRESSION:1.  Mildly increased activity in the left posterior prostate apex likely reflecting biopsy-proven malignancy.  2.  No evidence of abnormal focal hypermetabolic activity in the distal esophagus with slight concentric wall thickening. 3.  No FDG avid lymphadenopathy or distant metastasis. \par \par 4/29/22\par Patient is here for a follow-up visit for newly diagnosed GE junction cancer, newly diagnosed prostate cancer, and history of leukocytosis/thrombocytosis using [place]ian interpretation and accompanied by spouse via telephone.  Patient started chemotherapy with Carboplatin + Taxol as part of chemoRT on 4.25.2022.  Patient denies fever, chills, nausea, vomiting, dyspnea, dysphagia, changes in urination or bleeding.  He reports some fatigue.  He has not yet seen GI for management of H. pylori.  He continues to take prednisone 5mg daily for hx of ITP.  He takes Xarelto for venous issue of lower extremities but is unsure if ever had thrombosis.  \par \par 5/13/22\par Patient is here for a follow-up visit for GE junction cancer, prostate cancer, and history of leukocytosis/thrombocytosis using B[place]udian interpretation (ID# [id number]).  Patient started chemotherapy with Carboplatin + Taxol as part of chemoRT on 4.25.2022.  He is due for Week 4 of chemotherapy next week.  He states he has approximately 2 weeks more of RT planned.  He reports mild intermittent heartburn x 2-3 days and often starts to hiccup after meals.  He reports some fatigue.  Patient denies fever, chills, nausea, vomiting, dyspnea, dysphagia, neuropathy or bleeding.  He occasionally experiences diarrhea but this is not new and unchanged frequency.  He has seen GI for management of H. pylori and prescribed abx therapy to treat infection.  He continues to take prednisone 5mg daily for hx of ITP.  He takes Xarelto for venous issue of lower extremities but is unsure if ever had thrombosis.  He continues on IV Venofer, tolerating well. \par \par 5/27/22\par Patient is here for a follow-up visit for GE junction cancer, prostate cancer, and history of leukocytosis/thrombocytosis using Bermudian interpretation.  Patient started chemotherapy with Carboplatin + Taxol as part of chemoRT on 4.25.2022.  He is due for Week 6 of chemotherapy next week.  Reviewed most recent CBC which shows mild to moderate pancytopenia.  He notes mild hiccups and some abd discomfort.  He also acknowledges intermittent diarrhea.  Patient denies fever, chills, nausea, vomiting, dyspnea, dysphagia, neuropathy or bleeding.  He has seen GI for management of H. pylori and prescribed abx therapy to treat infection but he will wait until completion of chemoRT due to low counts.  He continues to take prednisone 5mg daily for hx of ITP.  He is s/p IV Venofer x5, completed on 5.23.2022.  \par \par 6/21/22\par Patient is here for a follow-up visit for GE junction cancer, prostate cancer and history of leukocytosis/thrombocytosis using [place]ian interpretation.  He is s/p Week 6 of chemotherapy on 6.12.2022.  Reviewed most recent CBC which shows mild anemia, hgb 12.2g/dL.  Patient states that the RT was reinitiated following normalization of blood counts and is now planned for completion on 6/24/2022.  Patient denies fever, chills, nausea, vomiting, dyspnea, dysphagia, neuropathy or bleeding.   [de-identified] : Irma is a dyana 74 yo gentleman with extensive hematologic history, but no records for review from his prior hematologists, all history is taken from the patient.  He has history of HTN, CAD, CABG s/p stent placement (on ASA and Xarelto) complicated by peritonitis who presents to clinic to establish care.  He reports that 15-20 years ago he was followed at [facility] and was diagnosed with "polycythemia" requiring phlebotomies, he has not required any phlebotomies for many years now. He also reports having been diagnosed with ITP, he reports his platelets were really low and he required high dose steroids, since he has been managed with Prednisone at 5mg daily for many years. During past several years his platelets have been hovering between 70K to low normal range.  He reports no history of bleeding, but recently he has developed easy bruising. He continues with daily baby ASA, he reports that previously he was taking Pradaxa for A fib, which was discontinued with easy bruisability onset.  Bruising has not improved with discontinuation of Pradaxa, but is mostly noted on dorsal aspect of bilateral upper extremities.  He reports that he has had bone marrow biopsy years ago, and is not keen on repeating the procedure now. He has been following with Dr. Srivastava over the past few years and now wishes to switch care. He reports no history of VTE or CVAs.  He also reports h/o L carotid stent. There was also consideration for R carotid intervention, this was never done. He has h/o PAD as well. He was last seen by [facility] in 2018.  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  Of note, patient also is being worked up for prostatic adenocarcinoma, Gaby 7.

## 2022-06-21 NOTE — CONSULT LETTER
[Dear  ___] : Dear  [unfilled], [Consult Letter:] : I had the pleasure of evaluating your patient, [unfilled]. [( Thank you for referring [unfilled] for consultation for _____ )] : Thank you for referring [unfilled] for consultation for [unfilled] [Please see my note below.] : Please see my note below. [Consult Closing:] : Thank you very much for allowing me to participate in the care of this patient.  If you have any questions, please do not hesitate to contact me. [Sincerely,] : Sincerely, [DrAvinash  ___] : Dr. SAXENA [DrAvinash ___] : Dr. SAXENA [FreeTextEntry3] : Heriberto Shankar DO\par Attending Physician,\par Hematology/ Medical Oncology\par [phone] office\par \par

## 2022-06-21 NOTE — REVIEW OF SYSTEMS
[Fatigue] : fatigue [Diarrhea] : diarrhea [Easy Bruising] : a tendency for easy bruising [Negative] : Allergic/Immunologic [Recent Change In Weight] : ~T recent weight change [Chest Pain] : no chest pain [Shortness Of Breath] : no shortness of breath [Skin Rash] : no skin rash [Easy Bleeding] : no tendency for easy bleeding [Swollen Glands] : no swollen glands [FreeTextEntry2] : 7lb weight loss since last  visit [FreeTextEntry7] : reports occasional hiccups and abd discomfort ; occasional diarrhea, not new and unchanged in frequency

## 2022-06-23 ENCOUNTER — APPOINTMENT (OUTPATIENT)
Dept: INFUSION THERAPY | Facility: CLINIC | Age: 77
End: 2022-06-23

## 2022-06-24 ENCOUNTER — APPOINTMENT (OUTPATIENT)
Dept: INFUSION THERAPY | Facility: CLINIC | Age: 77
End: 2022-06-24

## 2022-06-24 RX ORDER — PACLITAXEL 6 MG/ML
85 INJECTION, SOLUTION, CONCENTRATE INTRAVENOUS ONCE
Refills: 0 | Status: COMPLETED | OUTPATIENT
Start: 2022-06-24 | End: 2022-06-24

## 2022-06-24 RX ORDER — SODIUM CHLORIDE 9 MG/ML
1000 INJECTION INTRAMUSCULAR; INTRAVENOUS; SUBCUTANEOUS
Refills: 0 | Status: DISCONTINUED | OUTPATIENT
Start: 2022-06-24 | End: 2022-10-18

## 2022-06-24 RX ORDER — CARBOPLATIN 50 MG
122 VIAL (EA) INTRAVENOUS ONCE
Refills: 0 | Status: COMPLETED | OUTPATIENT
Start: 2022-06-24 | End: 2022-06-24

## 2022-06-24 RX ORDER — FAMOTIDINE 10 MG/ML
20 INJECTION INTRAVENOUS ONCE
Refills: 0 | Status: COMPLETED | OUTPATIENT
Start: 2022-06-24 | End: 2022-06-24

## 2022-06-24 RX ORDER — DIPHENHYDRAMINE HCL 50 MG
50 CAPSULE ORAL ONCE
Refills: 0 | Status: COMPLETED | OUTPATIENT
Start: 2022-06-24 | End: 2022-06-24

## 2022-06-24 RX ORDER — DEXAMETHASONE 0.5 MG/5ML
20 ELIXIR ORAL ONCE
Refills: 0 | Status: COMPLETED | OUTPATIENT
Start: 2022-06-24 | End: 2022-06-24

## 2022-06-24 RX ADMIN — Medication 262.2 MILLIGRAM(S): at 11:16

## 2022-06-24 RX ADMIN — FAMOTIDINE 104 MILLIGRAM(S): 10 INJECTION INTRAVENOUS at 10:24

## 2022-06-24 RX ADMIN — Medication 126 MILLIGRAM(S): at 10:24

## 2022-06-24 RX ADMIN — SODIUM CHLORIDE 333 MILLILITER(S): 9 INJECTION INTRAMUSCULAR; INTRAVENOUS; SUBCUTANEOUS at 10:23

## 2022-06-24 RX ADMIN — Medication 102 MILLIGRAM(S): at 10:23

## 2022-06-24 RX ADMIN — PACLITAXEL 264.17 MILLIGRAM(S): 6 INJECTION, SOLUTION, CONCENTRATE INTRAVENOUS at 11:17

## 2022-07-01 ENCOUNTER — LABORATORY RESULT (OUTPATIENT)
Age: 77
End: 2022-07-01

## 2022-07-01 ENCOUNTER — APPOINTMENT (OUTPATIENT)
Dept: INFUSION THERAPY | Facility: CLINIC | Age: 77
End: 2022-07-01

## 2022-07-01 RX ORDER — SODIUM CHLORIDE 9 MG/ML
1000 INJECTION INTRAMUSCULAR; INTRAVENOUS; SUBCUTANEOUS
Refills: 0 | Status: DISCONTINUED | OUTPATIENT
Start: 2022-07-01 | End: 2022-10-18

## 2022-07-01 RX ADMIN — SODIUM CHLORIDE 333 MILLILITER(S): 9 INJECTION INTRAMUSCULAR; INTRAVENOUS; SUBCUTANEOUS at 09:45

## 2022-07-05 LAB
ANION GAP SERPL CALC-SCNC: 11 MMOL/L
BUN SERPL-MCNC: 29 MG/DL
CALCIUM SERPL-MCNC: 8.5 MG/DL
CHLORIDE SERPL-SCNC: 108 MMOL/L
CO2 SERPL-SCNC: 22 MMOL/L
CREAT SERPL-MCNC: 1.4 MG/DL
EGFR: 52 ML/MIN/1.73M2
GLUCOSE SERPL-MCNC: 106 MG/DL
HCT VFR BLD CALC: 28.9 %
HGB BLD-MCNC: 9.6 G/DL
MAGNESIUM SERPL-MCNC: 1.7 MG/DL
MCHC RBC-ENTMCNC: 30.7 PG
MCHC RBC-ENTMCNC: 33.2 G/DL
MCV RBC AUTO: 92.3 FL
PLATELET # BLD AUTO: 150 K/UL
PMV BLD: 10.5 FL
POTASSIUM SERPL-SCNC: 4.1 MMOL/L
RBC # BLD: 3.13 M/UL
RBC # FLD: 23.2 %
SODIUM SERPL-SCNC: 141 MMOL/L
WBC # FLD AUTO: 8.57 K/UL

## 2022-07-08 ENCOUNTER — APPOINTMENT (OUTPATIENT)
Dept: INFUSION THERAPY | Facility: CLINIC | Age: 77
End: 2022-07-08

## 2022-07-08 ENCOUNTER — LABORATORY RESULT (OUTPATIENT)
Age: 77
End: 2022-07-08

## 2022-07-08 LAB
ANION GAP SERPL CALC-SCNC: 13 MMOL/L
BUN SERPL-MCNC: 26 MG/DL
CALCIUM SERPL-MCNC: 8.8 MG/DL
CHLORIDE SERPL-SCNC: 102 MMOL/L
CO2 SERPL-SCNC: 25 MMOL/L
CREAT SERPL-MCNC: 1.6 MG/DL
EGFR: 44 ML/MIN/1.73M2
GLUCOSE SERPL-MCNC: 114 MG/DL
HCT VFR BLD CALC: 31.4 %
HGB BLD-MCNC: 10.3 G/DL
MAGNESIUM SERPL-MCNC: 2 MG/DL
MCHC RBC-ENTMCNC: 31 PG
MCHC RBC-ENTMCNC: 32.8 G/DL
MCV RBC AUTO: 94.6 FL
PLATELET # BLD AUTO: 106 K/UL
PMV BLD: 9.7 FL
POTASSIUM SERPL-SCNC: 4.7 MMOL/L
RBC # BLD: 3.32 M/UL
RBC # FLD: 23.7 %
SODIUM SERPL-SCNC: 140 MMOL/L
WBC # FLD AUTO: 7.79 K/UL

## 2022-07-08 RX ORDER — SODIUM CHLORIDE 9 MG/ML
1000 INJECTION INTRAMUSCULAR; INTRAVENOUS; SUBCUTANEOUS
Refills: 0 | Status: DISCONTINUED | OUTPATIENT
Start: 2022-07-08 | End: 2022-10-18

## 2022-07-13 ENCOUNTER — APPOINTMENT (OUTPATIENT)
Dept: GASTROENTEROLOGY | Facility: CLINIC | Age: 77
End: 2022-07-13

## 2022-07-15 ENCOUNTER — OUTPATIENT (OUTPATIENT)
Dept: OUTPATIENT SERVICES | Facility: HOSPITAL | Age: 77
LOS: 1 days | Discharge: HOME | End: 2022-07-15

## 2022-07-15 ENCOUNTER — APPOINTMENT (OUTPATIENT)
Dept: INFUSION THERAPY | Facility: CLINIC | Age: 77
End: 2022-07-15

## 2022-07-15 ENCOUNTER — LABORATORY RESULT (OUTPATIENT)
Age: 77
End: 2022-07-15

## 2022-07-15 DIAGNOSIS — C16.0 MALIGNANT NEOPLASM OF CARDIA: ICD-10-CM

## 2022-07-15 DIAGNOSIS — C61 MALIGNANT NEOPLASM OF PROSTATE: ICD-10-CM

## 2022-07-15 DIAGNOSIS — N17.9 ACUTE KIDNEY FAILURE, UNSPECIFIED: ICD-10-CM

## 2022-07-15 DIAGNOSIS — Z95.1 PRESENCE OF AORTOCORONARY BYPASS GRAFT: Chronic | ICD-10-CM

## 2022-07-15 DIAGNOSIS — Z98.890 OTHER SPECIFIED POSTPROCEDURAL STATES: Chronic | ICD-10-CM

## 2022-07-15 LAB
ANION GAP SERPL CALC-SCNC: 13 MMOL/L
BUN SERPL-MCNC: 29 MG/DL
CALCIUM SERPL-MCNC: 8.7 MG/DL
CHLORIDE SERPL-SCNC: 105 MMOL/L
CO2 SERPL-SCNC: 22 MMOL/L
CREAT SERPL-MCNC: 1.5 MG/DL
EGFR: 48 ML/MIN/1.73M2
GLUCOSE SERPL-MCNC: 95 MG/DL
HCT VFR BLD CALC: 32.2 %
HGB BLD-MCNC: 10.6 G/DL
MAGNESIUM SERPL-MCNC: 2 MG/DL
MCHC RBC-ENTMCNC: 32.1 PG
MCHC RBC-ENTMCNC: 32.9 G/DL
MCV RBC AUTO: 97.6 FL
PLATELET # BLD AUTO: 84 K/UL
PMV BLD: 9.2 FL
POTASSIUM SERPL-SCNC: 3.9 MMOL/L
RBC # BLD: 3.3 M/UL
RBC # FLD: 23 %
SODIUM SERPL-SCNC: 140 MMOL/L
WBC # FLD AUTO: 6.53 K/UL

## 2022-07-15 RX ORDER — SODIUM CHLORIDE 9 MG/ML
1000 INJECTION INTRAMUSCULAR; INTRAVENOUS; SUBCUTANEOUS
Refills: 0 | Status: COMPLETED | OUTPATIENT
Start: 2022-07-15 | End: 2022-07-15

## 2022-07-15 RX ADMIN — SODIUM CHLORIDE 333 MILLILITER(S): 9 INJECTION INTRAMUSCULAR; INTRAVENOUS; SUBCUTANEOUS at 09:54

## 2022-07-20 ENCOUNTER — APPOINTMENT (OUTPATIENT)
Dept: HEMATOLOGY ONCOLOGY | Facility: CLINIC | Age: 77
End: 2022-07-20

## 2022-07-22 ENCOUNTER — APPOINTMENT (OUTPATIENT)
Dept: INFUSION THERAPY | Facility: CLINIC | Age: 77
End: 2022-07-22

## 2022-07-25 ENCOUNTER — EMERGENCY (EMERGENCY)
Facility: HOSPITAL | Age: 77
LOS: 0 days | Discharge: HOME | End: 2022-07-25
Attending: EMERGENCY MEDICINE | Admitting: EMERGENCY MEDICINE

## 2022-07-25 VITALS
TEMPERATURE: 99 F | SYSTOLIC BLOOD PRESSURE: 110 MMHG | RESPIRATION RATE: 18 BRPM | DIASTOLIC BLOOD PRESSURE: 65 MMHG | HEART RATE: 64 BPM | OXYGEN SATURATION: 99 %

## 2022-07-25 VITALS
TEMPERATURE: 99 F | SYSTOLIC BLOOD PRESSURE: 103 MMHG | HEART RATE: 68 BPM | WEIGHT: 115.08 LBS | RESPIRATION RATE: 20 BRPM | DIASTOLIC BLOOD PRESSURE: 60 MMHG | OXYGEN SATURATION: 95 %

## 2022-07-25 DIAGNOSIS — R50.9 FEVER, UNSPECIFIED: ICD-10-CM

## 2022-07-25 DIAGNOSIS — E86.0 DEHYDRATION: ICD-10-CM

## 2022-07-25 DIAGNOSIS — R09.89 OTHER SPECIFIED SYMPTOMS AND SIGNS INVOLVING THE CIRCULATORY AND RESPIRATORY SYSTEMS: ICD-10-CM

## 2022-07-25 DIAGNOSIS — Z98.890 OTHER SPECIFIED POSTPROCEDURAL STATES: Chronic | ICD-10-CM

## 2022-07-25 DIAGNOSIS — Z95.1 PRESENCE OF AORTOCORONARY BYPASS GRAFT: Chronic | ICD-10-CM

## 2022-07-25 DIAGNOSIS — Z85.01 PERSONAL HISTORY OF MALIGNANT NEOPLASM OF ESOPHAGUS: ICD-10-CM

## 2022-07-25 DIAGNOSIS — I25.10 ATHEROSCLEROTIC HEART DISEASE OF NATIVE CORONARY ARTERY WITHOUT ANGINA PECTORIS: ICD-10-CM

## 2022-07-25 DIAGNOSIS — U07.1 COVID-19: ICD-10-CM

## 2022-07-25 DIAGNOSIS — N17.9 ACUTE KIDNEY FAILURE, UNSPECIFIED: ICD-10-CM

## 2022-07-25 DIAGNOSIS — I10 ESSENTIAL (PRIMARY) HYPERTENSION: ICD-10-CM

## 2022-07-25 DIAGNOSIS — R05.1 ACUTE COUGH: ICD-10-CM

## 2022-07-25 DIAGNOSIS — Z95.1 PRESENCE OF AORTOCORONARY BYPASS GRAFT: ICD-10-CM

## 2022-07-25 LAB
ALBUMIN SERPL ELPH-MCNC: 4.1 G/DL — SIGNIFICANT CHANGE UP (ref 3.5–5.2)
ALP SERPL-CCNC: 87 U/L — SIGNIFICANT CHANGE UP (ref 30–115)
ALT FLD-CCNC: 23 U/L — SIGNIFICANT CHANGE UP (ref 0–41)
ANION GAP SERPL CALC-SCNC: 15 MMOL/L — HIGH (ref 7–14)
APPEARANCE UR: CLEAR — SIGNIFICANT CHANGE UP
AST SERPL-CCNC: 42 U/L — HIGH (ref 0–41)
BACTERIA # UR AUTO: NEGATIVE — SIGNIFICANT CHANGE UP
BASOPHILS # BLD AUTO: 0.01 K/UL — SIGNIFICANT CHANGE UP (ref 0–0.2)
BASOPHILS NFR BLD AUTO: 0.1 % — SIGNIFICANT CHANGE UP (ref 0–1)
BILIRUB SERPL-MCNC: 0.5 MG/DL — SIGNIFICANT CHANGE UP (ref 0.2–1.2)
BILIRUB UR-MCNC: NEGATIVE — SIGNIFICANT CHANGE UP
BUN SERPL-MCNC: 31 MG/DL — HIGH (ref 10–20)
CALCIUM SERPL-MCNC: 8.7 MG/DL — SIGNIFICANT CHANGE UP (ref 8.5–10.1)
CHLORIDE SERPL-SCNC: 95 MMOL/L — LOW (ref 98–110)
CO2 SERPL-SCNC: 22 MMOL/L — SIGNIFICANT CHANGE UP (ref 17–32)
COLOR SPEC: YELLOW — SIGNIFICANT CHANGE UP
CREAT SERPL-MCNC: 2.2 MG/DL — HIGH (ref 0.7–1.5)
DIFF PNL FLD: ABNORMAL
EGFR: 30 ML/MIN/1.73M2 — LOW
EOSINOPHIL # BLD AUTO: 0.04 K/UL — SIGNIFICANT CHANGE UP (ref 0–0.7)
EOSINOPHIL NFR BLD AUTO: 0.5 % — SIGNIFICANT CHANGE UP (ref 0–8)
EPI CELLS # UR: 2 /HPF — SIGNIFICANT CHANGE UP (ref 0–5)
GLUCOSE SERPL-MCNC: 142 MG/DL — HIGH (ref 70–99)
GLUCOSE UR QL: NEGATIVE — SIGNIFICANT CHANGE UP
HCT VFR BLD CALC: 34.1 % — LOW (ref 42–52)
HGB BLD-MCNC: 11.3 G/DL — LOW (ref 14–18)
HYALINE CASTS # UR AUTO: 11 /LPF — HIGH (ref 0–7)
IMM GRANULOCYTES NFR BLD AUTO: 0.9 % — HIGH (ref 0.1–0.3)
KETONES UR-MCNC: NEGATIVE — SIGNIFICANT CHANGE UP
LEUKOCYTE ESTERASE UR-ACNC: NEGATIVE — SIGNIFICANT CHANGE UP
LYMPHOCYTES # BLD AUTO: 1.45 K/UL — SIGNIFICANT CHANGE UP (ref 1.2–3.4)
LYMPHOCYTES # BLD AUTO: 19.3 % — LOW (ref 20.5–51.1)
MCHC RBC-ENTMCNC: 32.3 PG — HIGH (ref 27–31)
MCHC RBC-ENTMCNC: 33.1 G/DL — SIGNIFICANT CHANGE UP (ref 32–37)
MCV RBC AUTO: 97.4 FL — HIGH (ref 80–94)
MONOCYTES # BLD AUTO: 0.24 K/UL — SIGNIFICANT CHANGE UP (ref 0.1–0.6)
MONOCYTES NFR BLD AUTO: 3.2 % — SIGNIFICANT CHANGE UP (ref 1.7–9.3)
NEUTROPHILS # BLD AUTO: 5.71 K/UL — SIGNIFICANT CHANGE UP (ref 1.4–6.5)
NEUTROPHILS NFR BLD AUTO: 76 % — HIGH (ref 42.2–75.2)
NITRITE UR-MCNC: NEGATIVE — SIGNIFICANT CHANGE UP
NRBC # BLD: 0 /100 WBCS — SIGNIFICANT CHANGE UP (ref 0–0)
PH UR: 6 — SIGNIFICANT CHANGE UP (ref 5–8)
PLATELET # BLD AUTO: 145 K/UL — SIGNIFICANT CHANGE UP (ref 130–400)
POTASSIUM SERPL-MCNC: 4.9 MMOL/L — SIGNIFICANT CHANGE UP (ref 3.5–5)
POTASSIUM SERPL-SCNC: 4.9 MMOL/L — SIGNIFICANT CHANGE UP (ref 3.5–5)
PROT SERPL-MCNC: 7.2 G/DL — SIGNIFICANT CHANGE UP (ref 6–8)
PROT UR-MCNC: ABNORMAL
RBC # BLD: 3.5 M/UL — LOW (ref 4.7–6.1)
RBC # FLD: 21 % — HIGH (ref 11.5–14.5)
RBC CASTS # UR COMP ASSIST: 5 /HPF — HIGH (ref 0–4)
SODIUM SERPL-SCNC: 132 MMOL/L — LOW (ref 135–146)
SP GR SPEC: 1.02 — SIGNIFICANT CHANGE UP (ref 1.01–1.03)
UROBILINOGEN FLD QL: SIGNIFICANT CHANGE UP
WBC # BLD: 7.52 K/UL — SIGNIFICANT CHANGE UP (ref 4.8–10.8)
WBC # FLD AUTO: 7.52 K/UL — SIGNIFICANT CHANGE UP (ref 4.8–10.8)
WBC UR QL: 5 /HPF — SIGNIFICANT CHANGE UP (ref 0–5)

## 2022-07-25 PROCEDURE — 99284 EMERGENCY DEPT VISIT MOD MDM: CPT

## 2022-07-25 PROCEDURE — 71045 X-RAY EXAM CHEST 1 VIEW: CPT | Mod: 26

## 2022-07-25 RX ORDER — SODIUM CHLORIDE 9 MG/ML
1000 INJECTION INTRAMUSCULAR; INTRAVENOUS; SUBCUTANEOUS ONCE
Refills: 0 | Status: COMPLETED | OUTPATIENT
Start: 2022-07-25 | End: 2022-07-25

## 2022-07-25 RX ADMIN — SODIUM CHLORIDE 1000 MILLILITER(S): 9 INJECTION INTRAMUSCULAR; INTRAVENOUS; SUBCUTANEOUS at 19:25

## 2022-07-25 RX ADMIN — SODIUM CHLORIDE 1000 MILLILITER(S): 9 INJECTION INTRAMUSCULAR; INTRAVENOUS; SUBCUTANEOUS at 16:27

## 2022-07-25 RX ADMIN — SODIUM CHLORIDE 1000 MILLILITER(S): 9 INJECTION INTRAMUSCULAR; INTRAVENOUS; SUBCUTANEOUS at 19:06

## 2022-07-25 NOTE — ED PROVIDER NOTE - WR ORDER ID 1
Patient Education     Rapid Detection of Respiratory Syncytial Virus  Does this test have other names?  RSV rapid detection, RSV indirect immunofluorescence assay, IFA  What is this test?  This test looks at cells taken from fluid in your nose or throat to see if you have respiratory syncytial virus (RSV). RSV attacks the upper respiratory tract.   This test looks for antigens in these nose or throat cells to quickly diagnose an infection. An antigen is a substance in the virus that causes your body's immune system to make antibodies. The test is accurate 80% to 90% of the time.   If you have RSV, you can spread it to others through coughing and sneezing for about 10 days after your symptoms start. Once you have been infected, you are less likely to get it again. If you do, your symptoms are likely to be milder.   If your symptoms are severe, you may need treatment in the hospital. .   Why do I need this test?  You may need this test if you have symptoms of RSV and you are at risk for a serious infection, such as bronchiolitis or pneumonia. This test is more commonly used in babies and young children. Symptoms of RSV infection may include:     Runny nose    Sore throat    Cough    Fever    Wheezing    Headache    Severe tiredness (fatigue)  For most people, RSV symptoms go away on their own within a few days to a few weeks. But babies, young children, older adults, and people with weak immune systems are at risk for severe infections from this virus.   You may need this test to:    Make an early and quick diagnosis to start early treatment    Make an early and quick diagnosis to help prevent you from spreading the virus to others    Diagnose RSV so that your healthcare provider can rule out other causes of respiratory disease  What other tests might I have along with this test?  Your healthcare provider may also use a cell culture to test your nasal or throat secretions. Blood tests to diagnose RSV are also  available, but it can take a long time to get these test results.   What do my test results mean?  Test results may vary depending on your age, gender, health history, the method used for the test, and other things. Your test results may not mean you have a problem. Ask your healthcare provider what your test results mean for you.    Normal results are negative, meaning you don't have RSV. If your test is positive, you may be infected with RSV.   How is this test done?  This test is done with a sample of secretions from the area at the back of your nose or throat, or both. Your healthcare provider may collect the sample by using a swab, a soft rubber bulb, or a plastic tube called a catheter. You may need to tip your head back for this.   Does this test pose any risks?  This test poses no known risks. You may feel some mild discomfort or a gagging sensation as the provider collects the sample.   What might affect my test results?  This test may be affected by how soon the test is done after your infection starts. It works best if the test is done in the first few days after symptoms begin. The results are more reliable in young children and less reliable in older children and adults.   How do I get ready for this test?  You don't need to get ready for this test. Be sure your healthcare provider knows about all medicines, herbs, vitamins, and supplements you are taking. This includes medicines that don't need a prescription and any illegal drugs you may use.   [employer] last reviewed this educational content on 2020    1665-6550 [employer]. All rights reserved. This information is not intended as a substitute for professional medical care. Always follow your healthcare professional's instructions.           Patient Education     RSV (Respiratory Syncytial Virus) Infection  RSV (respiratory syncytial virus) is a common cause of respiratory infections in people of all ages. RSV occurs more often in  the winter and early spring. RSV is so common that almost all children have had the virus by age 2. Older adults and people who have weak immune systems can get RSV again later in life. This is because their immunity to RSV goes down over time. RSV symptoms are often mild. But RSV can be a serious problem for high-risk infants, young children, and older adults. These groups may have more serious infections and trouble breathing.  How RSV spreads  RSV spreads easily when a person with the infection coughs or sneezes. It spreads by direct contact with an infected person. For example, kissing a child with RSV spreads the virus. And the virus can live on hard surfaces. A person can get RSV by touching something with the virus on it. These can include crib rails and door knobs. It spreads quickly in group settings, such as  and schools.  Symptoms of RSV  Most babies and children with RSV have the same symptoms as a cold or flu. These include a stuffy or runny nose, a cough, headache, and a low-grade fever. Older adults may get pneumonia.  Treating RSV  RSV most often goes away on its own. There is no treatment for RSV in most cases. Antibiotics are not used unless your child has a bacterial infection. To ease some of your child's symptoms:    Ask your child s healthcare provider or nurse about lowering your child's fever. You should know what medicine to use and how much and how often to use it. Make sure your child isn't wearing too much clothing.     If your child is old enough, give him or her fluids, such as water and juice.    Remove mucus from your baby s nose with a rubber bulb suction device. Be gentle so you don't cause more swelling and mild pain. Ask your child s provider or nurse for instructions.    Don t let anyone smoke around your child.  Babies and children with severe symptoms need to be treated in the hospital. They are watched closely. They may have treatment such as:    IV (intravenous)  "fluids    Oxygen     Suctioning of mucus    Breathing treatments    Anti-inflammatory medicine such as steroids  Children with very serious breathing problems have a breathing tube. The tube is put in the throat and down into the lungs. This is called intubation. The tube is attached to a machine (ventilator) that helps them breathe.    When to call the healthcare provider  Call your child's provider right away if your child has any of these:    Fever (see \"Fever and children\" below)    A seizure with a high fever    A cough    Wheezing, breathing faster than normal, or trouble breathing    Flaring the nostrils or straining the chest or stomach while breathing    Skin around the mouth or fingers turns a blue color    Restlessness or grouchiness, can't be soothed    Trouble eating, drinking, or swallowing    Shortness of breath    Needing to sit upright (in bed or in a chair) to catch his or her breath  Fever and children  Always use a digital thermometer to check your child s temperature. Never use a mercury thermometer.  For infants and toddlers, be sure to use a rectal thermometer correctly. A rectal thermometer may accidentally poke a hole in (perforate) the rectum. It may also pass on germs from the stool. Always follow the product maker s directions for proper use. If you don t feel comfortable taking a rectal temperature, use another method. When you talk to your child s healthcare provider, tell him or her which method you used to take your child s temperature.  Here are guidelines for fever temperature. Ear temperatures aren t accurate before 6 months of age. Don t take an oral temperature until your child is at least 4 years old.  Infant under 3 months old:    Ask your child s healthcare provider how you should take the temperature.    Rectal or forehead temperature of 100.4 F (38 C) or higher, or as directed by the provider.    Armpit temperature of 99 F (37.2 C) or higher, or as directed by the " provider.  Child age 3 to 36 months:    Rectal, forehead, or ear temperature of 102 F (38.9 C) or higher, or as directed by the provider.    Armpit temperature of 101 F (38.3 C) or higher, or as directed by the provider.  Child of any age:    Repeated temperature of 104 F (40 C) or higher, or as directed by the provider.    Fever that lasts more than 24 hours in a child under 2 years old. Or a fever that lasts for 3 days in a child 2 years or older.     Preventing RSV infection  To help prevent the infection:    Clean your hands before and after holding or touching your child. Use alcohol-based hand . Or wash your hands with warm water and soap for at least 15 to 30 seconds.      Clean all surfaces with disinfectant  or wipes.    Teach your child to keep his or her hands clean. Have your child wash his or her hands often. Teach them wash their hands for as long as it takes to sing the ABC song or the Happy Birthday song. Or have them use an alcohol-based hand .    Have all family members or caregivers clean their hands before holding or touching your child.    Closely watch your own health and that of family members and your child s friends. Try to prevent contact between your child and those with a cold or fever.    Don t smoke around your child.    Ask your child's healthcare provider if your child is at risk for RSV. If your child is at risk, he or she may get shots (injections) during RSV season. These are to help prevent the illness.  [employer] last reviewed this educational content on 6/1/2019 2000-2021 [employer]. All rights reserved. This information is not intended as a substitute for professional medical care. Always follow your healthcare professional's instructions.            [id number]

## 2022-07-25 NOTE — ED PROVIDER NOTE - OBJECTIVE STATEMENT
76 yo M pmhx htn, esophageal cancer, CAD s/p CABG, recently tested positive for covid-19 10 days ago Presenting to the ED for evaluation of fever and cough x10 days.  Patient and wife were concerned that his symptoms have been going on for 10 days.  Patient reports no fever today, mild dry cough. admits to runny nose. Denies any weakness, chest pain, shortness of breath, abdominal pain, nausea, vomiting, dysuria, hematuria.

## 2022-07-25 NOTE — ED PROVIDER NOTE - NS ED ATTENDING STATEMENT MOD
This was a shared visit with the SERJIO. I reviewed and verified the documentation and independently performed the documented:

## 2022-07-25 NOTE — ED PROVIDER NOTE - NS ED ROS FT
Constitutional: (+) fever (-) malaise (-) diaphoresis (-) chills   Eyes: (-) visual changes (-) eye pain (-) eye discharge (-) photophobia (-) FB sensation  ENMT: (-) nasal congestion (+) runny nose (-) sore throat (-) hoarseness  (-) hearing changes (-) ear pain (-) ear discharge or infections (-) neck pain (-) neck stiffness  Cardiac: (-) chest pain  (-) palpitations (-) syncope (-) edema  Respiratory: (+) cough (-) SOB (-) TENORIO  GI: (-) nausea (-) vomiting (-) diarrhea (-) abdominal pain   : (-) dysuria (-) increased frequency  (-) hematuria (-) incontinence  MS: (-) back pain (-) myalgia (-) muscle weakness (-)  joint pain  Neuro: (-) headache (-) dizziness (-) numbness/tingling to extremities B/L (-) weakness   Skin: (-) rash (-) laceration    Except as documented in the HPI, all other systems are negative.

## 2022-07-25 NOTE — ED PROVIDER NOTE - PROGRESS NOTE DETAILS
BH: VSS, sat>90% RA with exertion, well appearing, clinical picture c/w coronavirus, unlikely underlying cardiac or vascular complications, mild dehydration, IVF given, results d/w pt, states feels well enough to go home, no TENORIO, ambulating in ED w/o difficulty, advised they do not require hospitalization at this time although if symptoms change or worsen, may need to be hospitalized at a later date, also advised to self isolate.    The patient was given detailed return precautions and advised to return to the emergency department if any new symptoms developed, symptoms worsened or for any concerns. The patient was offered the opportunity to ask questions and verbalized that they understand the diagnosis and discharge instructions.

## 2022-07-25 NOTE — ED PROVIDER NOTE - PATIENT PORTAL LINK FT
You can access the FollowMyHealth Patient Portal offered by [facility] by registering at the following website: [email or web address]. By joining "[employer]’s FollowMyHealth portal, you will also be able to view your health information using other applications (apps) compatible with our system.

## 2022-07-27 LAB
CULTURE RESULTS: SIGNIFICANT CHANGE UP
SPECIMEN SOURCE: SIGNIFICANT CHANGE UP

## 2022-08-19 ENCOUNTER — APPOINTMENT (OUTPATIENT)
Dept: HEMATOLOGY ONCOLOGY | Facility: CLINIC | Age: 77
End: 2022-08-19

## 2022-09-11 NOTE — PHYSICAL EXAM
COVID Exposure Screen- Patient        1. *Have you had a COVID-19 test in the last 90 days? ( ) Yes ( x) No ( ) Unknown- Reason: _____    IF YES PROCEED TO QUESTION #2. IF NO OR UNKNOWN, PLEASE SKIP TO QUESTION #3.    2. Date of test(s) and result(s): ________    3. *Have you tested positive for COVID-19 antibodies? ( ) Yes ( ) No ( x) Unknown- Reason: _____    IF YES PROCEED TO QUESTION #4. IF NO or UNKNOWN, PLEASE SKIP TO QUESTION #5.    4. Date of positive antibody test: ________    5. *Have you received 2 doses of the COVID-19 vaccine? ( x) Yes ( ) No ( ) Unknown- Reason: _____    IF YES PROCEED TO QUESTION #6. IF NO or UNKNOWN, PLEASE SKIP TO QUESTION #7.    6. Date of second dose: ________    7. *In the past 10 days, have you been around anyone with a positive COVID-19 test?* ( ) Yes ( x) No ( ) Unknown- Reason: ____    IF YES PROCEED TO QUESTION #8. IF NO or UNKNOWN, PLEASE SKIP TO QUESTION #13.    8. Were you within 6 feet of them for at least 15 minutes? ( ) Yes ( ) No ( ) Unknown- Reason: _____    9. Have you provided care for them? ( ) Yes ( ) No ( ) Unknown- Reason: ______    10. Have you had direct physical contact with them (touched, hugged, or kissed them)? ( ) Yes ( ) No ( ) Unknown- Reason: _____    11. Have you shared eating or drinking utensils with them? ( ) Yes ( ) No ( ) Unknown- Reason: ____    12. Have they sneezed, coughed, or somehow gotten respiratory droplets on you? ( ) Yes ( ) No ( ) Unknown- Reason: ______    13. *Have you been out of [place] within the past 10 days?* ( ) Yes (x ) No ( ) Unknown- Reason: _____    IF YES PLEASE ANSWER THE FOLLOWING QUESTIONS:    14. Which state/country have you been to? ______    15. Were you there over 24 hours? ( ) Yes ( ) No ( ) Unknown- Reason: ______    16. Date of return to [facility]: ______ [General Appearance - Alert] : alert [General Appearance - In No Acute Distress] : in no acute distress [Sclera] : the sclera and conjunctiva were normal [PERRL With Normal Accommodation] : pupils were equal in size, round, and reactive to light [Extraocular Movements] : extraocular movements were intact [Neck Appearance] : the appearance of the neck was normal [Neck Cervical Mass (___cm)] : no neck mass was observed [Jugular Venous Distention Increased] : there was no jugular-venous distention [Thyroid Diffuse Enlargement] : the thyroid was not enlarged [Thyroid Nodule] : there were no palpable thyroid nodules [Auscultation Breath Sounds / Voice Sounds] : lungs were clear to auscultation bilaterally [Heart Rate And Rhythm] : heart rate was normal and rhythm regular [Heart Sounds] : normal S1 and S2 [Heart Sounds Gallop] : no gallops [Murmurs] : no murmurs [Heart Sounds Pericardial Friction Rub] : no pericardial rub [Bowel Sounds] : normal bowel sounds [Abdomen Soft] : soft [Abdomen Tenderness] : non-tender [Abdomen Mass (___ Cm)] : no abdominal mass palpated [Abnormal Walk] : normal gait [Nail Clubbing] : no clubbing  or cyanosis of the fingernails [Musculoskeletal - Swelling] : no joint swelling seen [Motor Tone] : muscle strength and tone were normal [Skin Color & Pigmentation] : normal skin color and pigmentation [Skin Turgor] : normal skin turgor [] : no rash [Deep Tendon Reflexes (DTR)] : deep tendon reflexes were 2+ and symmetric [Sensation] : the sensory exam was normal to light touch and pinprick [No Focal Deficits] : no focal deficits [Oriented To Time, Place, And Person] : oriented to person, place, and time [Impaired Insight] : insight and judgment were intact [Affect] : the affect was normal

## 2024-10-22 NOTE — ED PROVIDER NOTE - SECONDARY DIAGNOSIS.
ER staff witnessed patient becoming diaphoretic and pale while walking back from XR. Staff assisted patient to lie down on a stretcher. Provider at bedside, placed patient on monitor and blood glucose obtained     Ellie Conteh RN  10/22/24 [id number]    
Emphasized importance of getting a urine sample. Pt unable to void at this time. Placed urine container at bedside     Ellie Conteh RN  10/22/24 [id number]    
RACHELE (acute kidney injury)